# Patient Record
Sex: MALE | Race: WHITE | Employment: FULL TIME | URBAN - METROPOLITAN AREA
[De-identification: names, ages, dates, MRNs, and addresses within clinical notes are randomized per-mention and may not be internally consistent; named-entity substitution may affect disease eponyms.]

---

## 2020-11-18 ENCOUNTER — OFFICE VISIT (OUTPATIENT)
Dept: FAMILY MEDICINE | Facility: CLINIC | Age: 40
End: 2020-11-18
Payer: COMMERCIAL

## 2020-11-18 ENCOUNTER — ANCILLARY PROCEDURE (OUTPATIENT)
Dept: GENERAL RADIOLOGY | Facility: CLINIC | Age: 40
End: 2020-11-18
Attending: NURSE PRACTITIONER
Payer: COMMERCIAL

## 2020-11-18 VITALS
DIASTOLIC BLOOD PRESSURE: 88 MMHG | HEIGHT: 71 IN | BODY MASS INDEX: 26.07 KG/M2 | HEART RATE: 81 BPM | RESPIRATION RATE: 16 BRPM | SYSTOLIC BLOOD PRESSURE: 124 MMHG | OXYGEN SATURATION: 99 % | TEMPERATURE: 97.7 F | WEIGHT: 186.2 LBS

## 2020-11-18 DIAGNOSIS — M75.82 ROTATOR CUFF TENDINITIS, LEFT: Primary | ICD-10-CM

## 2020-11-18 DIAGNOSIS — M75.82 ROTATOR CUFF TENDINITIS, LEFT: ICD-10-CM

## 2020-11-18 DIAGNOSIS — M77.01 MEDIAL EPICONDYLITIS OF ELBOW, RIGHT: ICD-10-CM

## 2020-11-18 PROCEDURE — 99213 OFFICE O/P EST LOW 20 MIN: CPT | Performed by: NURSE PRACTITIONER

## 2020-11-18 PROCEDURE — 73030 X-RAY EXAM OF SHOULDER: CPT | Mod: LT | Performed by: RADIOLOGY

## 2020-11-18 RX ORDER — NAPROXEN 500 MG/1
500 TABLET ORAL 2 TIMES DAILY WITH MEALS
Qty: 60 TABLET | Refills: 1 | Status: SHIPPED | OUTPATIENT
Start: 2020-11-18 | End: 2022-02-15

## 2020-11-18 ASSESSMENT — MIFFLIN-ST. JEOR: SCORE: 1780.69

## 2020-11-18 NOTE — PROGRESS NOTES
"Subjective     Junior Rodriguez is a 40 year old male who presents to clinic today for the following health issues:    HPI         Musculoskeletal problem/pain  Onset/Duration: 1 week  Description  Location: shoulder - left  Joint Swelling: no  Redness: YES- maybe  Pain: YES  Warmth: YES  Intensity:  3-4/10, 10/10 with movement  Progression of Symptoms:  same  Accompanying signs and symptoms:   Fevers: no  Numbness/tingling/weakness: YES- all, decreased ROM  History  Trauma to the area: no  Recent illness:  no  Previous similar problem: YES  Previous evaluation:  no  Precipitating or alleviating factors:  Aggravating factors include: any type of movement  Therapies tried and outcome: rest/inactivity, acetaminophen and Ibuprofen      Musculoskeletal problem/pain  Onset/Duration: intermittent for 1 1/2 years  Description  Location: elbow - right, medial  Joint Swelling: no  Redness: no  Pain: YES  Warmth: no  Intensity:  0/10 currently  Progression of Symptoms:  worsening and intermittent  Accompanying signs and symptoms:   Fevers: no  Numbness/tingling/weakness: YES- all  History  Trauma to the area: no  Recent illness:  no  Previous similar problem: chronic issue  Previous evaluation:  no  Precipitating or alleviating factors:  Aggravating factors include: reaching out, any type of movement with arm stretched out  Therapies tried and outcome: acetaminophen and Ibuprofen        Review of Systems   Constitutional, HEENT, cardiovascular, pulmonary, gi and gu systems are negative, except as otherwise noted.      Objective    /88 (BP Location: Right arm, Patient Position: Chair, Cuff Size: Adult Regular)   Pulse 81   Temp 97.7  F (36.5  C) (Tympanic)   Resp 16   Ht 1.81 m (5' 11.25\")   Wt 84.5 kg (186 lb 3.2 oz)   SpO2 99%   BMI 25.79 kg/m    Body mass index is 25.79 kg/m .  Physical Exam   GENERAL: healthy, alert and no distress  MS: Right elbow: Joint appearance normal, ROM normal. Tenderness over the medial " epicondylitis.    Left shoulder:  Inspection: no swelling, bruising, discoloration, or obvious deformity or asymmetry  Tender: greater tuberosity  Range of Motion  Active:limited due to pain.  Passive: limited due to pain. Patient was very guarded  Strength: strength testing limited due to pain and guarding.  Special tests:  Positive: [place], Grant and cross arm adduction              Assessment & Plan     Rotator cuff tendinitis, left  - XR Shoulder Left 2 Views; Future  - PHYSICAL THERAPY REFERRAL; Future  - naproxen (NAPROSYN) 500 MG tablet; Take 1 tablet (500 mg) by mouth 2 times daily (with meals)    Medial epicondylitis of elbow, right        Patient Instructions   For elbow:  Wear elbow strap  Discussed strengthening, and stretching - handout given.  Ice or heat as preferred at least twice daily.   Return for Follow up if not improving in 4 weeks.   Consider Physical therapy at that time if needed.      For shoulder:  Schedule PT  For pain, naproxen 500 mg twice daily as needed.  Follow up if no improvement in 4 weeks.        Return in about 4 weeks (around 12/16/2020), or if symptoms worsen or fail to improve.    The risks, benefits and treatment options of prescribed medications or other treatments have been discussed with the patient. The patient verbalized their understanding and should call or follow up if no improvement or if they develop further problems.    POLLO Giraldo [facility]

## 2020-11-18 NOTE — PATIENT INSTRUCTIONS
For elbow:  Wear elbow strap  Discussed strengthening, and stretching - handout given.  Ice or heat as preferred at least twice daily.   Return for Follow up if not improving in 4 weeks.   Consider Physical therapy at that time if needed.      For shoulder:  Schedule PT  For pain, naproxen 500 mg twice daily as needed.  Follow up if no improvement in 4 weeks.

## 2020-11-24 ENCOUNTER — HOSPITAL ENCOUNTER (OUTPATIENT)
Dept: PHYSICAL THERAPY | Facility: CLINIC | Age: 40
Setting detail: THERAPIES SERIES
End: 2020-11-24
Attending: NURSE PRACTITIONER
Payer: COMMERCIAL

## 2020-11-24 DIAGNOSIS — M75.82 ROTATOR CUFF TENDINITIS, LEFT: ICD-10-CM

## 2020-11-24 PROCEDURE — 97161 PT EVAL LOW COMPLEX 20 MIN: CPT | Mod: GP | Performed by: PHYSICAL THERAPIST

## 2020-11-24 PROCEDURE — 97140 MANUAL THERAPY 1/> REGIONS: CPT | Mod: GP,59 | Performed by: PHYSICAL THERAPIST

## 2020-11-24 PROCEDURE — 97110 THERAPEUTIC EXERCISES: CPT | Mod: GP | Performed by: PHYSICAL THERAPIST

## 2020-11-24 NOTE — PROGRESS NOTES
11/24/20 0700   General Information   Type of Visit Initial OP Ortho PT Evaluation   Start of Care Date 11/24/20   Referring Physician Diann Carlos   Patient/Family Goals Statement pick his kid up, turn blinker on his car, move his arm without pain.    Orders Evaluate and Treat   Date of Order 11/18/20   Certification Required? Yes   Medical Diagnosis rotator cuff tendintis left   Surgical/Medical history reviewed Yes   Precautions/Limitations no known precautions/limitations   General Information Comments PMH: none   Body Part(s)   Body Part(s) Shoulder   Presentation and Etiology   Pertinent history of current problem (include personal factors and/or comorbidities that impact the POC) He has been having shoulder pain on the left side for about 1-2 weeks now. The shoulder pain has been coming on/off over the past years. He works in construction. He gets some numbness and tingling into all of his finger tips. Feels weak because of the pain. Rates pain at 4/10 at rest. HE has been taking Naproxen and this hasn't really made a difference. Sleeping is horrible keeps him from falling asleep and wakes him up at night. No neck pain. Pain is always deeper in the shoulder across the of the shoulder to the middle of the humerous.    Impairments A. Pain;B. Decreased WB tolerance;D. Decreased ROM;E. Decreased flexibility;F. Decreased strength and endurance;K. Numbness;L. Tingling   Functional Limitations perform activities of daily living;perform required work activities;perform desired leisure / sports activities   Symptom Location left shoulder   How/Where did it occur From insidious onset   Onset date of current episode/exacerbation 11/10/20   Chronicity New   Pain rating (0-10 point scale) Best (/10);Worst (/10)   Best (/10) 4   Worst (/10) 10   Pain quality A. Sharp;B. Dull;C. Aching;E. Shooting;F. Stabbing;G. Cramping   Frequency of pain/symptoms A. Constant   Pain/symptoms are: The same all the time    Pain/symptoms exacerbated by A. Sitting;C. Lifting;D. Carrying;E. Rest;F. Nothing;G. Certain positions;H. Overhead reach;J. ADL;K. Home tasks;L. Work tasks   Pain/symptoms eased by D. Nothing   Progression of symptoms since onset: Worsened   Current Level of Function   Patient role/employment history A. 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 No   Have you fallen and had an injury in the past year? No   Is patient a fall risk? No   Abuse Screen (yes response referral indicated)   Feels Unsafe at Home or Work/School no   Feels Threatened by Someone no   Does Anyone Try to Keep You From Having Contact with Others or Doing Things Outside Your Home? no   Physical Signs of Abuse Present no   Functional Scales   Functional Scales Other   Shoulder Objective Findings   Side (if bilateral, select both right and left) Left   Observation carrys left arm in sweatshirt pocket as a sling    Integumentary  no concerns   Posture forward shoulder posture, left heavily protracted forward in protective position    Cervical Screen (ROM, quadrant) WNL B no increase in pain,  Spurlings negative    Shoulder ROM Comment very difficult to relax and painful with all movements    Pec Minor (supine) Flexibility 4 fingers    Neer's Test + L    Grant-Pj Test + L    Shoulder Special Tests Comments unable to test due to significant pain getting into various test positions    Palpation non tender to palpatio over infra/suprap or biceps tendon    Accessory Motion/Joint Mobility AP and inferior glide normal with no increase in pain    Left Shoulder Flexion AROM 35*   Left Shoulder Flexion PROM left: 90   Left Shoulder Abduction AROM 30*   Left Shoulder Abduction PROM left: 40   Left Shoulder ER AROM 60 with arm at side   Left Shoulder ER PROM arm at side L 40 degrees   Left Shoulder IR AROM L5   Left  Shoulder Flexion Strength 3+/5 painful    Left Shoulder Abduction Strength 3+/5 painful    Left Shoulder ER Strength 3+/5 painful    Left Shoulder IR Strength 4/5 no pain    Left Shoulder Extension Strength 3+/5 painful   Planned Therapy Interventions   Planned Therapy Interventions manual therapy;joint mobilization;neuromuscular re-education;ROM;strengthening;stretching;transfer training   Planned Therapy Interventions Comment [id number]   Clinical Impression   Criteria for Skilled Therapeutic Interventions Met yes, treatment indicated   PT Diagnosis left shoulder RC tendinitis    Influenced by the following impairments decreased left shoulder ROM, decreased left shoulder strength, pain, poor posture   Functional limitations due to impairments sleeping, reaching, lifting, dressing, showering   Clinical Presentation Stable/Uncomplicated   Clinical Presentation Rationale clinical decision making and chart review    Clinical Decision Making (Complexity) Low complexity   Therapy Frequency 1 time/week   Predicted Duration of Therapy Intervention (days/wks) 10 weeks   Risk & Benefits of therapy have been explained Yes   Patient, Family & other staff in agreement with plan of care Yes   Clinical Impression Comments Patient is a 40 year old male who has had on/off shoulder pain on the L side for over a year. Most recently the shoulder became sore this last week or two. Signs and symptoms consistent with left RC tendintis with pain with all movements Passive and Active motions. Patient would benefit from skilled PT intervention to improve impairments listed above. PT prognosis is very good with skilled PT intervention    Education Assessment   Preferred Learning Style Listening;Demonstration;Pictures/video   Barriers to Learning No barriers   ORTHO GOALS   PT Ortho Eval Goals 1;2;3;4   Ortho Goal 1   Goal Identifier 1   Goal Description Patient will improve PROM left shoulder flexion to 140 degrees in order to show improved  pain levels with shoulder movements.    Target Date 12/22/20   Ortho Goal 2   Goal Identifier 2   Goal Description Patient will improve AAROM shoulder flexion to 100 degrees in order to show improved tolerace for moving shoulder   Target Date 12/22/20   Ortho Goal 3   Goal Identifier 3   Goal Description Patient will be able to reach overhead into cupboard without increased left shoulder pain.    Target Date 02/02/21   Ortho Goal 4   Goal Identifier 4   Goal Description Patient will be able to don/doff jacket without increased left shoulder pain.    Target Date 02/02/21   Total Evaluation Time   PT Eval, Low Complexity Minutes (14614) 22   Therapy Certification   Certification date from 11/24/20   Certification date to 02/02/21   Medical Diagnosis rotator cuff tendintis Left        Vanessa Villegas  PT, DPT       11/24/2020   [address] [address]  [email or web address]  Voicemail: [phone]

## 2020-11-24 NOTE — PROGRESS NOTES
[facility]          OUTPATIENT PHYSICAL THERAPY ORTHOPEDIC EVALUATION  PLAN OF TREATMENT FOR OUTPATIENT REHABILITATION  (COMPLETE FOR INITIAL CLAIMS ONLY)  Patient's Last Name, First Name, M.I.  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  [id number]   Start of Care Date:  11/24/20   Onset Date:  11/10/20   Type:     _X__PT   ___OT   ___SLP Medical Diagnosis:  (P) rotator cuff tendintis Left      PT Diagnosis:  (P) left shoulder RC tendinitis    Visits from SOC:  1      _________________________________________________________________________________  Plan of Treatment/Functional Goals:  (P) manual therapy, joint mobilization, neuromuscular re-education, ROM, strengthening, stretching, transfer training  (P) [id number]        Goals  Goal Identifier: (P) 1  Goal Description: (P) Patient will improve PROM left shoulder flexion to 140 degrees in order to show improved pain levels with shoulder movements.   Target Date: (P) 12/22/20    Goal Identifier: (P) 2  Goal Description: (P) Patient will improve AAROM shoulder flexion to 100 degrees in order to show improved tolerace for moving shoulder  Target Date: (P) 12/22/20    Goal Identifier: (P) 3  Goal Description: (P) Patient will be able to reach overhead into cupboard without increased left shoulder pain.   Target Date: (P) 02/02/21    Goal Identifier: (P) 4  Goal Description: (P) Patient will be able to don/doff jacket without increased left shoulder pain.   Target Date: (P) 02/02/21             Therapy Frequency:  (P) 1 time/week  Predicted Duration of Therapy Intervention:  (P) 10 weeks    Vanessa Villegas, PT                 I CERTIFY THE NEED FOR THESE SERVICES FURNISHED UNDER        THIS PLAN OF TREATMENT AND WHILE UNDER MY CARE     (Physician co-signature of this document indicates review and certification of the therapy plan).                       Certification Date  From:  (P) 11/24/20   Certification Date To:  (P) 02/02/21    Referring Provider:  Diann Carlos    Initial Assessment        See Epic Evaluation Start of Care Date: 11/24/20

## 2020-12-01 ENCOUNTER — HOSPITAL ENCOUNTER (OUTPATIENT)
Dept: PHYSICAL THERAPY | Facility: CLINIC | Age: 40
Setting detail: THERAPIES SERIES
End: 2020-12-01
Attending: NURSE PRACTITIONER
Payer: COMMERCIAL

## 2020-12-01 PROCEDURE — 97110 THERAPEUTIC EXERCISES: CPT | Mod: GP | Performed by: PHYSICAL THERAPIST

## 2021-02-10 NOTE — PROGRESS NOTES
Outpatient Physical Therapy Discharge Note     Patient: Junior Rodriguez  : 1980    Beginning/End Dates of Reporting Period:  20 to 20    Referring Provider: Diann Trent Diagnosis:    left shoulder RC tendinitis           Client Self Report: the shoulder is feeling better. Not as sore at his side, just if he goes to move it. Rates pain at 1-2/10.     Objective Measurements:  Objective Measure: Left shoulder ROM   Details: PROM flexion 130, abduction 100, ER arm at side: 30 degrees         Goals:  Goal Identifier 1   Goal Description Patient will improve PROM left shoulder flexion to 140 degrees in order to show improved pain levels with shoulder movements.    Target Date 20   Date Met      Progress:     Goal Identifier 2   Goal Description Patient will improve AAROM shoulder flexion to 100 degrees in order to show improved tolerace for moving shoulder   Target Date 20   Date Met      Progress:     Goal Identifier 3   Goal Description Patient will be able to reach overhead into cupboard without increased left shoulder pain.    Target Date 21   Date Met      Progress:     Goal Identifier 4   Goal Description Patient will be able to don/doff jacket without increased left shoulder pain.    Target Date 21   Date Met      Progress:       Progress towards Goals:   Progress this reporting period: All information listed above was at patient's last attended PT visit. At her last attended session, patient was showing good improvements in left shoulder ROM and pain levels. Needs frequent reminders to not push into painful ranges and to no overdo things at home. Despite improvements, pt has failed to schedule f/u visits within 30 days from last visit thus is being d/c from therapy at this time. Objective measures are all taken from last visit. Current status is unknown at this time.    Plan:  Discharge from therapy.    Discharge:    Reason for Discharge: Patient has failed to  schedule further appointments.    Equipment Issued: none    Discharge Plan:  Not completed since patient failed to schedule further appointments.    Vanessa Villegas  PT, DPT       2/10/2021   [address] [address]  [email or web address]  Voicemail: [phone]

## 2022-02-15 ENCOUNTER — ANCILLARY PROCEDURE (OUTPATIENT)
Dept: GENERAL RADIOLOGY | Facility: CLINIC | Age: 42
End: 2022-02-15
Attending: PHYSICIAN ASSISTANT
Payer: COMMERCIAL

## 2022-02-15 ENCOUNTER — OFFICE VISIT (OUTPATIENT)
Dept: FAMILY MEDICINE | Facility: CLINIC | Age: 42
End: 2022-02-15
Payer: COMMERCIAL

## 2022-02-15 VITALS
TEMPERATURE: 97.8 F | BODY MASS INDEX: 28.84 KG/M2 | WEIGHT: 206 LBS | OXYGEN SATURATION: 99 % | HEART RATE: 69 BPM | DIASTOLIC BLOOD PRESSURE: 86 MMHG | RESPIRATION RATE: 20 BRPM | HEIGHT: 71 IN | SYSTOLIC BLOOD PRESSURE: 116 MMHG

## 2022-02-15 DIAGNOSIS — Z00.00 ROUTINE GENERAL MEDICAL EXAMINATION AT A HEALTH CARE FACILITY: Primary | ICD-10-CM

## 2022-02-15 DIAGNOSIS — M25.50 MULTIPLE JOINT PAIN: ICD-10-CM

## 2022-02-15 DIAGNOSIS — Z13.1 SCREENING FOR DIABETES MELLITUS: ICD-10-CM

## 2022-02-15 DIAGNOSIS — R33.9 URINARY RETENTION: ICD-10-CM

## 2022-02-15 DIAGNOSIS — R76.8 POSITIVE ANA (ANTINUCLEAR ANTIBODY): ICD-10-CM

## 2022-02-15 DIAGNOSIS — Z11.59 NEED FOR HEPATITIS C SCREENING TEST: ICD-10-CM

## 2022-02-15 DIAGNOSIS — Z13.220 SCREENING FOR HYPERLIPIDEMIA: ICD-10-CM

## 2022-02-15 LAB
CHOLEST SERPL-MCNC: 213 MG/DL
CRP SERPL-MCNC: <2.9 MG/L (ref 0–8)
ERYTHROCYTE [SEDIMENTATION RATE] IN BLOOD BY WESTERGREN METHOD: 8 MM/HR (ref 0–15)
FASTING STATUS PATIENT QL REPORTED: ABNORMAL
FASTING STATUS PATIENT QL REPORTED: NORMAL
GLUCOSE BLD-MCNC: 99 MG/DL (ref 70–99)
HDLC SERPL-MCNC: 42 MG/DL
LDLC SERPL CALC-MCNC: 138 MG/DL
NONHDLC SERPL-MCNC: 171 MG/DL
PSA SERPL-MCNC: 0.78 UG/L (ref 0–4)
TRIGL SERPL-MCNC: 163 MG/DL

## 2022-02-15 PROCEDURE — 84153 ASSAY OF PSA TOTAL: CPT | Performed by: PHYSICIAN ASSISTANT

## 2022-02-15 PROCEDURE — 36415 COLL VENOUS BLD VENIPUNCTURE: CPT | Performed by: PHYSICIAN ASSISTANT

## 2022-02-15 PROCEDURE — 73560 X-RAY EXAM OF KNEE 1 OR 2: CPT | Mod: RT | Performed by: RADIOLOGY

## 2022-02-15 PROCEDURE — 86140 C-REACTIVE PROTEIN: CPT | Performed by: PHYSICIAN ASSISTANT

## 2022-02-15 PROCEDURE — 86803 HEPATITIS C AB TEST: CPT | Performed by: PHYSICIAN ASSISTANT

## 2022-02-15 PROCEDURE — 86039 ANTINUCLEAR ANTIBODIES (ANA): CPT | Performed by: PHYSICIAN ASSISTANT

## 2022-02-15 PROCEDURE — 86038 ANTINUCLEAR ANTIBODIES: CPT | Performed by: PHYSICIAN ASSISTANT

## 2022-02-15 PROCEDURE — 73560 X-RAY EXAM OF KNEE 1 OR 2: CPT | Mod: LT | Performed by: RADIOLOGY

## 2022-02-15 PROCEDURE — 85652 RBC SED RATE AUTOMATED: CPT | Performed by: PHYSICIAN ASSISTANT

## 2022-02-15 PROCEDURE — 99214 OFFICE O/P EST MOD 30 MIN: CPT | Mod: 25 | Performed by: PHYSICIAN ASSISTANT

## 2022-02-15 PROCEDURE — 86200 CCP ANTIBODY: CPT | Performed by: PHYSICIAN ASSISTANT

## 2022-02-15 PROCEDURE — 80061 LIPID PANEL: CPT | Performed by: PHYSICIAN ASSISTANT

## 2022-02-15 PROCEDURE — 99396 PREV VISIT EST AGE 40-64: CPT | Performed by: PHYSICIAN ASSISTANT

## 2022-02-15 PROCEDURE — 86431 RHEUMATOID FACTOR QUANT: CPT | Performed by: PHYSICIAN ASSISTANT

## 2022-02-15 PROCEDURE — 73565 X-RAY EXAM OF KNEES: CPT | Performed by: RADIOLOGY

## 2022-02-15 PROCEDURE — 82947 ASSAY GLUCOSE BLOOD QUANT: CPT | Performed by: PHYSICIAN ASSISTANT

## 2022-02-15 RX ORDER — IBUPROFEN 800 MG/1
800 TABLET, FILM COATED ORAL EVERY 8 HOURS PRN
Qty: 180 TABLET | Refills: 3 | Status: CANCELLED | OUTPATIENT
Start: 2022-02-15

## 2022-02-15 RX ORDER — IBUPROFEN 800 MG/1
800 TABLET, FILM COATED ORAL EVERY 8 HOURS PRN
Qty: 180 TABLET | Refills: 3 | Status: SHIPPED | OUTPATIENT
Start: 2022-02-15 | End: 2022-04-29

## 2022-02-15 RX ORDER — NAPROXEN 500 MG/1
500 TABLET ORAL 2 TIMES DAILY WITH MEALS
Qty: 60 TABLET | Refills: 1 | Status: CANCELLED | OUTPATIENT
Start: 2022-02-15

## 2022-02-15 RX ORDER — OMEGA-3 FATTY ACIDS/FISH OIL 300-1000MG
800 CAPSULE ORAL EVERY 4 HOURS PRN
Status: CANCELLED | OUTPATIENT
Start: 2022-02-15

## 2022-02-15 ASSESSMENT — ENCOUNTER SYMPTOMS
COUGH: 0
NERVOUS/ANXIOUS: 0
PARESTHESIAS: 0
FREQUENCY: 1
SHORTNESS OF BREATH: 0
HEARTBURN: 0
DIZZINESS: 0
PALPITATIONS: 0
HEMATURIA: 0
EYE PAIN: 0
CHILLS: 0
MYALGIAS: 1
WEAKNESS: 0
DIARRHEA: 0
CONSTIPATION: 1
FEVER: 0
HEADACHES: 0
SORE THROAT: 0
ARTHRALGIAS: 1
NAUSEA: 0
ABDOMINAL PAIN: 0
HEMATOCHEZIA: 0
JOINT SWELLING: 1
DYSURIA: 0

## 2022-02-15 ASSESSMENT — PAIN SCALES - GENERAL: PAINLEVEL: MODERATE PAIN (4)

## 2022-02-15 ASSESSMENT — MIFFLIN-ST. JEOR: SCORE: 1865.5

## 2022-02-15 NOTE — LETTER
February 17, 2022      Junior Rodriguez  [address] [address]  [place] [facility] [place] [address]        Dear ,    We are writing to inform you of your test results.    lab work shows a positive VALERIE test.  This is a nonspecific autoimmune antibody test that is only moderately positive.  The rest of his work-up for rheumatoid arthritis is negative.  I do think that given his symmetrical joint pain in this positive test that there is some form of autoimmune arthritis.  However I would like to just recheck an VALERIE test in about 2 to 3 months to make sure it stays positive.  If this is the case we would send him to the rheumatologist for their evaluation.     Remainder of labs for diabetes and cholesterol are largely normal.  Recommend more of a plant-based diet and eating less processed/fatty/sugary foods.       Resulted Orders   PSA, tumor marker   Result Value Ref Range    PSA Tumor Marker 0.78 0.00 - 4.00 ug/L    Narrative    Assay Method:  Chemiluminescence using Siemens   Vista analyzer.   ESR: Erythrocyte sedimentation rate   Result Value Ref Range    Erythrocyte Sedimentation Rate 8 0 - 15 mm/hr   CRP, inflammation   Result Value Ref Range    CRP Inflammation <2.9 0.0 - 8.0 mg/L   Rheumatoid factor   Result Value Ref Range    Rheumatoid Factor 9 <12 IU/mL   Cyclic Citrullinated Peptide Antibody IgG   Result Value Ref Range    Cyclic Citrullinated Peptide Antibody IgG 2.0 <7.0 U/mL      Comment:      Negative   Anti Nuclear Gabriela IgG by IFA with Reflex   Result Value Ref Range    VALERIE interpretation Positive (A) Negative      Comment:        Negative:              <1:40  Borderline Positive:   1:40 - 1:80  Positive:              >1:80    VALERIE pattern 1 Speckled     VALERIE titer 1 1:160    Lipid panel reflex to direct LDL Fasting   Result Value Ref Range    Cholesterol 213 (H) <200 mg/dL    Triglycerides 163 (H) <150 mg/dL    Direct Measure HDL 42 >=40 mg/dL    LDL Cholesterol Calculated 138 (H) <=100 mg/dL    Non HDL  Cholesterol 171 (H) <130 mg/dL    Patient Fasting > 8hrs? Unknown     Narrative    Cholesterol  Desirable:  <200 mg/dL    Triglycerides  Normal:  Less than 150 mg/dL  Borderline High:  150-199 mg/dL  High:  200-499 mg/dL  Very High:  Greater than or equal to 500 mg/dL    Direct Measure HDL  Female:  Greater than or equal to 50 mg/dL   Male:  Greater than or equal to 40 mg/dL    LDL Cholesterol  Desirable:  <100mg/dL  Above Desirable:  100-129 mg/dL   Borderline High:  130-159 mg/dL   High:  160-189 mg/dL   Very High:  >= 190 mg/dL    Non HDL Cholesterol  Desirable:  130 mg/dL  Above Desirable:  130-159 mg/dL  Borderline High:  160-189 mg/dL  High:  190-219 mg/dL  Very High:  Greater than or equal to 220 mg/dL   Hepatitis C Screen Reflex to HCV RNA Quant and Genotype   Result Value Ref Range    Hepatitis C Antibody Nonreactive Nonreactive    Narrative    Assay performance characteristics have not been established for newborns, infants, and children.   GLUCOSE   Result Value Ref Range    Glucose 99 70 - 99 mg/dL    Patient Fasting > 8hrs? Unknown        If you have any questions or concerns, please call the clinic at the number listed above.       Sincerely,      Johnson Chowdhury PA-C/[place]

## 2022-02-15 NOTE — PATIENT INSTRUCTIONS
Preventive Health Recommendations  Male Ages 40 to 49  Yearly exam:             See your health care provider every year in order to  o   Review health changes.   o   Discuss preventive care.    o   Review your medicines if your doctor has prescribed any.    You should be tested each year for STDs (sexually transmitted diseases) if you re at risk.     Have a cholesterol test every 5 years.     Have a colonoscopy (test for colon cancer) if someone in your family has had colon cancer or polyps before age 50.     After age 45, have a diabetes test (fasting glucose). If you are at risk for diabetes, you should have this test every 3 years.      Talk with your health care provider about whether or not a prostate cancer screening test (PSA) is right for you.    Shots: Get a flu shot each year. Get a tetanus shot every 10 years.     Nutrition:    Eat at least 5 servings of fruits and vegetables daily.     Eat whole-grain bread, whole-wheat pasta and brown rice instead of white grains and rice.     Get adequate Calcium and Vitamin D.     Lifestyle    Exercise for at least 150 minutes a week (30 minutes a day, 5 days a week). This will help you control your weight and prevent disease.     Limit alcohol to one drink per day.     No smoking.     Wear sunscreen to prevent skin cancer.     See your dentist every six months for an exam and cleaning.

## 2022-02-15 NOTE — PROGRESS NOTES
SUBJECTIVE:   CC: Junior Rodriguez is an 41 year old male who presents for preventative health visit.     Patient has been advised of split billing requirements and indicates understanding: Yes     Healthy Habits:     Getting at least 3 servings of Calcium per day:  NO    Bi-annual eye exam:  Yes    Dental care twice a year:  NO    Sleep apnea or symptoms of sleep apnea:  None    Diet:  Regular (no restrictions)    Frequency of exercise:  2-3 days/week    Duration of exercise:  45-60 minutes    Taking medications regularly:  Yes    Medication side effects:  None    PHQ-2 Total Score: 0    Additional concerns today:  Yes    Musculoskeletal problem/pain    Duration: Years     Description  Location: Shoulder, elbows, knee     Intensity:  Moderate to  severe    Accompanying signs and symptoms: numbness, tingling and weakness    History  Previous similar problem: YES  Previous evaluation:  x-ray, MRI and CT    Precipitating or alleviating factors:  Trauma or overuse: YES  Aggravating factors include: overuse    Therapies tried and outcome: Ibuprofen, naproxen     Lateral elbow pain bilaterally - pain with lifting, shaking hands  Bilateral shoulder pain - hx of dislocation, pain at night sleeping, no MRIs  Bilateral knee pain - anterior patella, paige-lateral.  Low back pain  -Interested in cortisone injection if needed.     Today's PHQ-2 Score:   PHQ-2 ( 1999 [employer]) 2/15/2022   Q1: Little interest or pleasure in doing things 0   Q2: Feeling down, depressed or hopeless 0   PHQ-2 Score 0   PHQ-2 Total Score (12-17 Years)- Positive if 3 or more points; Administer PHQ-A if positive -   Q1: Little interest or pleasure in doing things Not at all   Q2: Feeling down, depressed or hopeless Not at all   PHQ-2 Score 0     Abuse: Current or Past(Physical, Sexual or Emotional)- No  Do you feel safe in your environment? Yes    Have you ever done Advance Care Planning? (For example, a Health Directive, POLST, or a discussion with a  medical provider or your loved ones about your wishes): No, advance care planning information given to patient to review.  Patient plans to discuss their wishes with loved ones or provider.      Social History     Tobacco Use     Smoking status: Current Every Day Smoker     Packs/day: 1.00     Years: 20.00     Pack years: 20.00     Smokeless tobacco: Never Used   Substance Use Topics     Alcohol use: No     If you drink alcohol do you typically have >3 drinks per day or >7 drinks per week? No    Alcohol Use 2/15/2022   Prescreen: >3 drinks/day or >7 drinks/week? Not Applicable   Prescreen: >3 drinks/day or >7 drinks/week? -   No flowsheet data found.    Last PSA: No results found for: PSA    Reviewed orders with patient. Reviewed health maintenance and updated orders accordingly - Yes  Lab work is in process    Reviewed and updated as needed this visit by clinical staff  Tobacco  Allergies  Meds  Problems  Med Hx  Surg Hx  Fam Hx  Soc Hx         Reviewed and updated as needed this visit by Provider  Tobacco  Allergies  Meds  Problems  Med Hx  Surg Hx  Fam Hx        Review of Systems   Constitutional: Negative for chills and fever.   HENT: Negative for congestion, ear pain, hearing loss and sore throat.    Eyes: Negative for pain and visual disturbance.   Respiratory: Negative for cough and shortness of breath.    Cardiovascular: Negative for chest pain, palpitations and peripheral edema.   Gastrointestinal: Positive for constipation. Negative for abdominal pain, diarrhea, heartburn, hematochezia and nausea.   Genitourinary: Positive for frequency and urgency. Negative for dysuria, genital sores, hematuria, impotence and penile discharge.   Musculoskeletal: Positive for arthralgias, joint swelling and myalgias.   Skin: Negative for rash.   Neurological: Negative for dizziness, weakness, headaches and paresthesias.   Psychiatric/Behavioral: Negative for mood changes. The patient is not nervous/anxious.   "    OBJECTIVE:   /86 (BP Location: Right arm, Patient Position: Sitting, Cuff Size: Adult Large)   Pulse 69   Temp 97.8  F (36.6  C) (Tympanic)   Resp 20   Ht 1.81 m (5' 11.25\")   Wt 93.4 kg (206 lb)   SpO2 99%   BMI 28.53 kg/m      Physical Exam  GENERAL APPEARANCE: healthy, alert and no distress  NECK: no adenopathy, no asymmetry, masses, or scars and thyroid normal to palpation  RESP: lungs clear to auscultation - no rales, rhonchi or wheezes  CV: regular rates and rhythm, normal S1 S2, no S3 or S4 and no murmur, click or rub  ABDOMEN: bowel sounds normal. Soft mild diffuse tenderness throughout. no hepatosplenomegaly or masses, no scars, striae, dilated veins, rashes, or lesions.  ORTHO:   Shoulder: inspection: no swelling, deformity or atrophy.  Tender: non tender over AC joint, supraspinatus insertion, biceps groove or infraspinatus insertion.   ROM: Full ROM bilaterally. 0 to 180 extension. 0 to 100 external rotation. T7 internal rotation.   Strength: 5/5 strength throughout with pain.   Special: painful arc. Negative speeds. Positive neer's bilaterally.     Elbow Exam: Inspection: no swelling, no olecranon bursa swelling, no distal bicep tendon defect  Non-tender: lateral epicondyle, medial epicondyle and radial head/neck  Range of Motion: all normal  Strength: elbow strength full  Special tests: no pain with resisted wrist extension, no pain with resisted wrist flexion:   Knee Exam: Inspection: AP/lateral alignment normal, No effusion  Tender: distal IT band  Non-tender: lateral patellar facet, medial patellar facet, inferior pole patella, lateral joint line, medial joint line, popliteal region  Active Range of Motion: all normal bilaterally.   Strength:5/5 strength throughout bilaterally.   Special tests: normal Valgus stress test, normal Varus, no apprehension with lateral stress of the patella  SKIN: no suspicious lesions or rashes  PSYCH: mentation appears normal and affect " normal/bright    Diagnostic Test Results:  Labs reviewed in Epic    ASSESSMENT/PLAN:   Routine general medical examination at a health care facility  41 yr old here for physical exam. Last physical exam done several years ago. Discussed preventative screenings which are updated below. Counseled on immunizations and healthy lifestyle. Declined PPSV23 and Covid vaccines today. Follow-up in 1 year for repeat physical exam.  - REVIEW OF HEALTH MAINTENANCE PROTOCOL ORDERS    Need for hepatitis C screening test  Due for screening. Ordered today.   - Hepatitis C Screen Reflex to HCV RNA Quant and Genotype; Future    Screening for hyperlipidemia  Due for screening. Ordered today.   - Lipid panel reflex to direct LDL Fasting; Future    Screening for diabetes mellitus  Due for screening. Ordered today.   - GLUCOSE; Future    Multiple joint pain  Chronic symmetric joint pain worse in the elbows, shoulders and knees.  While I do think that some of his pain is related to his work as a  and not adequate strengthening or stretching he has not been worked up for more systemic causes of his pain.  We will work-up for RA, x-rays of his knees today.  Did consider a connective tissue disease like EDS given his age and chronic joint pain.  He can continue ibuprofen as needed for pain.  - Anti Nuclear Gabriela IgG by IFA with Reflex; Future  - Cyclic Citrullinated Peptide Antibody IgG; Future  - Rheumatoid factor; Future  - CRP, inflammation; Future  - ESR: Erythrocyte sedimentation rate; Future  - XR Knee AP Standing Bilateral; Future  - XR Knee Right 3 Views; Future  - XR Knee Left 3 Views; Future    Urinary retention  Worsening over the years.  Check a PSA today.  - PSA, tumor marker; Future    Patient has been advised of split billing requirements and indicates understanding: Yes    COUNSELING:   Reviewed preventive health counseling, as reflected in patient instructions       Regular exercise       Healthy  "diet/nutrition       Vision screening       Immunizations    Declined: Pneumococcal, Covid due to Concerns about side effects/safety           Consider Hep C screening for all patients one time for ages 18-79 years    Estimated body mass index is 28.53 kg/m  as calculated from the following:    Height as of this encounter: 1.81 m (5' 11.25\").    Weight as of this encounter: 93.4 kg (206 lb).     Weight management plan: Discussed healthy diet and exercise guidelines    He reports that he has been smoking. He has a 20.00 pack-year smoking history. He has never used smokeless tobacco.  Tobacco Cessation Action Plan:   Information offered: Patient not interested at this time    Physician Attestation   I, Johnson Chowdhury, was present with the medical/SUSAN student who participated in the service and in the documentation of the note.  I have verified the history and personally performed the physical exam and medical decision making.  I agree with the assessment and plan of care as documented in the note.      JEANETH Amato PA-C  [facility]  "

## 2022-02-16 LAB
ANA PAT SER IF-IMP: ABNORMAL
ANA SER QL IF: POSITIVE
ANA TITR SER IF: ABNORMAL {TITER}
CCP AB SER IA-ACNC: 2 U/ML
HCV AB SERPL QL IA: NONREACTIVE
RHEUMATOID FACT SER NEPH-ACNC: 9 IU/ML

## 2022-03-10 ENCOUNTER — OFFICE VISIT (OUTPATIENT)
Dept: URGENT CARE | Facility: URGENT CARE | Age: 42
End: 2022-03-10
Payer: COMMERCIAL

## 2022-03-10 VITALS
HEART RATE: 80 BPM | TEMPERATURE: 98.2 F | DIASTOLIC BLOOD PRESSURE: 80 MMHG | SYSTOLIC BLOOD PRESSURE: 112 MMHG | RESPIRATION RATE: 18 BRPM | WEIGHT: 206 LBS | OXYGEN SATURATION: 98 % | BODY MASS INDEX: 28.53 KG/M2

## 2022-03-10 DIAGNOSIS — M25.50 MULTIPLE JOINT PAIN: ICD-10-CM

## 2022-03-10 DIAGNOSIS — R10.31 RLQ ABDOMINAL PAIN: Primary | ICD-10-CM

## 2022-03-10 LAB
ALBUMIN UR-MCNC: NEGATIVE MG/DL
AMORPH CRY #/AREA URNS HPF: ABNORMAL /HPF
APPEARANCE UR: ABNORMAL
BACTERIA #/AREA URNS HPF: ABNORMAL /HPF
BILIRUB UR QL STRIP: NEGATIVE
COLOR UR AUTO: YELLOW
ERYTHROCYTE [DISTWIDTH] IN BLOOD BY AUTOMATED COUNT: 12.6 % (ref 10–15)
GLUCOSE UR STRIP-MCNC: NEGATIVE MG/DL
HCT VFR BLD AUTO: 38.8 % (ref 40–53)
HGB BLD-MCNC: 13.3 G/DL (ref 13.3–17.7)
HGB UR QL STRIP: NEGATIVE
KETONES UR STRIP-MCNC: NEGATIVE MG/DL
LEUKOCYTE ESTERASE UR QL STRIP: NEGATIVE
MCH RBC QN AUTO: 31.5 PG (ref 26.5–33)
MCHC RBC AUTO-ENTMCNC: 34.3 G/DL (ref 31.5–36.5)
MCV RBC AUTO: 92 FL (ref 78–100)
NITRATE UR QL: NEGATIVE
PH UR STRIP: 7.5 [PH] (ref 5–7)
PLATELET # BLD AUTO: 288 10E3/UL (ref 150–450)
RBC # BLD AUTO: 4.22 10E6/UL (ref 4.4–5.9)
RBC #/AREA URNS AUTO: ABNORMAL /HPF
SP GR UR STRIP: 1.02 (ref 1–1.03)
UROBILINOGEN UR STRIP-ACNC: 0.2 E.U./DL
WBC # BLD AUTO: 9.8 10E3/UL (ref 4–11)
WBC #/AREA URNS AUTO: ABNORMAL /HPF

## 2022-03-10 PROCEDURE — 81001 URINALYSIS AUTO W/SCOPE: CPT | Performed by: STUDENT IN AN ORGANIZED HEALTH CARE EDUCATION/TRAINING PROGRAM

## 2022-03-10 PROCEDURE — 99214 OFFICE O/P EST MOD 30 MIN: CPT | Performed by: STUDENT IN AN ORGANIZED HEALTH CARE EDUCATION/TRAINING PROGRAM

## 2022-03-10 PROCEDURE — 86038 ANTINUCLEAR ANTIBODIES: CPT | Performed by: STUDENT IN AN ORGANIZED HEALTH CARE EDUCATION/TRAINING PROGRAM

## 2022-03-10 PROCEDURE — 86039 ANTINUCLEAR ANTIBODIES (ANA): CPT | Performed by: STUDENT IN AN ORGANIZED HEALTH CARE EDUCATION/TRAINING PROGRAM

## 2022-03-10 PROCEDURE — 36415 COLL VENOUS BLD VENIPUNCTURE: CPT | Performed by: STUDENT IN AN ORGANIZED HEALTH CARE EDUCATION/TRAINING PROGRAM

## 2022-03-10 PROCEDURE — 85027 COMPLETE CBC AUTOMATED: CPT | Performed by: STUDENT IN AN ORGANIZED HEALTH CARE EDUCATION/TRAINING PROGRAM

## 2022-03-10 ASSESSMENT — PAIN SCALES - GENERAL: PAINLEVEL: MODERATE PAIN (5)

## 2022-03-10 NOTE — PROGRESS NOTES
Assessment & Plan     RLQ abdominal pain  Patient states RLQ/groin pain x1 month on assessment when palpated pain rates pain 8/10. There is a possible weakening in the right lower abdominal musculature but no clear hernia felt on exam. WBC normal. UA unremarkable. Patient denies urinary symptoms and states he has had no nausea, vomiting or diarrhea. Further workup is needed to evaluate the cause of the pain related to possible hernia or kidney stone. Unlikely appendicitis due to length of symptoms however given tenderness in the RLQ and no other identifiable cause, will get CT to rule this out. . Patient has a scheduled CT for tomorrow.   - CBC with platelets  - UA Macro with Reflex to Micro and Culture - lab collect  - CBC with platelets  - UA Macro with Reflex to Micro and Culture - lab collect  - Urine Microscopic  - CT Abdomen Pelvis w Contrast    Multiple joint pain  - Anti Nuclear Gabriela IgG by IFA with Reflex     Physician Attestation   I, POLLO Bush CNP, saw this patient and agree with the findings and plan of care as documented in the note by Naheed Orellana, ANJELICA student.    POLLO Bush CNP    35 minutes spent on the date of the encounter doing chart review, review of test results, interpretation of tests, patient visit and documentation      No follow-ups on file.    POLLO Bush CNP  [facility]    Karl Pacheco is a 41 year old male who presents to clinic today for the following health issues: Patient states about a month ago he started having RLQ/groing pain that he describes as burning. 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 He denies fever, chills, headache, vomiting or diarrhea. He states today when he was at the store that the pain got so bad he had to stop and rest and it feels better when he pushes in  on the spot with pain.  Chief Complaint   Patient presents with     Groin Pain     2 months having groin pain getting worse for 1 week, burning pain now on the right side, not sleeping. Taking ibuprofen.     HPI  Dryer broke and was cleaning around it.       Abdominal Pain    Location: LUQ and groin   Radiation: None.    Pain character: aching and burning,   Severity: 8 on a scale of 1-10.    Duration: 1 month(s)   Course of Illness: slowly progressive.  Exacerbated by: movement/walking  Relieved by: remaining still.  Associated Symptoms: none.  Female : Not applicable  Surgical History: none        Review of Systems  Constitutional, HEENT, cardiovascular, pulmonary, gi and gu systems are negative, except as otherwise noted.      Objective    /80 (BP Location: Right arm, Patient Position: Sitting, Cuff Size: Adult Large)   Pulse 80   Temp 98.2  F (36.8  C) (Tympanic)   Resp 18   Wt 93.4 kg (206 lb)   SpO2 98%   BMI 28.53 kg/m    Physical Exam   GENERAL: healthy, alert and no distress  NECK: no adenopathy, no asymmetry, masses, or scars and thyroid normal to palpation  RESP: lungs clear to auscultation - no rales, rhonchi or wheezes  CV: regular rate and rhythm, normal S1 S2, no S3 or S4, no murmur, click or rub, no peripheral edema and peripheral pulses strong  ABDOMEN: soft, nontender, without hepatosplenomegaly or masses, tenderness LUQ, bowel sounds normal and no palpable or pulsatile masses  MS: no gross musculoskeletal defects noted, no edema

## 2022-03-11 ENCOUNTER — HOSPITAL ENCOUNTER (OUTPATIENT)
Dept: CT IMAGING | Facility: CLINIC | Age: 42
Discharge: HOME | End: 2022-03-11
Attending: STUDENT IN AN ORGANIZED HEALTH CARE EDUCATION/TRAINING PROGRAM | Admitting: STUDENT IN AN ORGANIZED HEALTH CARE EDUCATION/TRAINING PROGRAM
Payer: COMMERCIAL

## 2022-03-11 DIAGNOSIS — R10.31 RLQ ABDOMINAL PAIN: ICD-10-CM

## 2022-03-11 LAB
ANA PAT SER IF-IMP: ABNORMAL
ANA SER QL IF: ABNORMAL
ANA TITR SER IF: ABNORMAL {TITER}

## 2022-03-11 PROCEDURE — 74177 CT ABD & PELVIS W/CONTRAST: CPT

## 2022-03-11 PROCEDURE — 250N000011 HC RX IP 250 OP 636: Performed by: STUDENT IN AN ORGANIZED HEALTH CARE EDUCATION/TRAINING PROGRAM

## 2022-03-11 PROCEDURE — 250N000009 HC RX 250: Performed by: STUDENT IN AN ORGANIZED HEALTH CARE EDUCATION/TRAINING PROGRAM

## 2022-03-11 RX ORDER — IOPAMIDOL 755 MG/ML
100 INJECTION, SOLUTION INTRAVASCULAR ONCE
Status: COMPLETED | OUTPATIENT
Start: 2022-03-11 | End: 2022-03-11

## 2022-03-11 RX ADMIN — IOPAMIDOL 100 ML: 755 INJECTION, SOLUTION INTRAVENOUS at 16:19

## 2022-03-11 RX ADMIN — SODIUM CHLORIDE 65 ML: 9 INJECTION, SOLUTION INTRAVENOUS at 16:19

## 2022-03-14 ENCOUNTER — TELEPHONE (OUTPATIENT)
Dept: FAMILY MEDICINE | Facility: CLINIC | Age: 42
End: 2022-03-14
Payer: COMMERCIAL

## 2022-03-14 NOTE — TELEPHONE ENCOUNTER
Reason for Call:  CT Results     Detailed comments: Pt is asking CT Results. Pt said he is sore not as bad as when he came in.    Phone Number Patient can be reached at: Home number on file [phone]    Best Time: Any Time      Can we leave a detailed message on this number? YES    Call taken on 3/14/2022 at 10:21 AM by Ramona Alexander

## 2022-03-14 NOTE — TELEPHONE ENCOUNTER
Called and notified patient that the ordering provider has not yet reviewed his results.    Pamela Luque MA

## 2022-03-17 ENCOUNTER — VIRTUAL VISIT (OUTPATIENT)
Dept: FAMILY MEDICINE | Facility: CLINIC | Age: 42
End: 2022-03-17
Payer: COMMERCIAL

## 2022-03-17 DIAGNOSIS — M25.551 HIP PAIN, RIGHT: Primary | ICD-10-CM

## 2022-03-17 DIAGNOSIS — K86.2 PANCREATIC CYST: ICD-10-CM

## 2022-03-17 DIAGNOSIS — K76.0 FATTY LIVER DISEASE, NONALCOHOLIC: ICD-10-CM

## 2022-03-17 PROCEDURE — 99214 OFFICE O/P EST MOD 30 MIN: CPT | Mod: GT | Performed by: PHYSICIAN ASSISTANT

## 2022-03-17 RX ORDER — CYCLOBENZAPRINE HCL 5 MG
5 TABLET ORAL 3 TIMES DAILY PRN
Qty: 30 TABLET | Refills: 0 | Status: SHIPPED | OUTPATIENT
Start: 2022-03-17 | End: 2022-11-28

## 2022-03-17 NOTE — PROGRESS NOTES
Junior is a 41 year old who is being evaluated via a billable video visit.      How would you like to obtain your AVS? Mail a copy  If the video visit is dropped, the invitation should be resent by: Text to cell phone: [phone]  Will anyone else be joining your video visit? No    Video Start Time: 3:26 PM    Assessment & Plan   Hip pain, right  Unclear etiology. Work up in  for RLQ abdominal pain was unremarkable. Pt is worse with movement of the hip joint. Recommend return clinic appt as his symptoms need an exam. Rx for Flexeril in the meantime. Recommend R hip XR.   - XR Hip Right 2-3 Views; Future  - cyclobenzaprine (FLEXERIL) 5 MG tablet; Take 1 tablet (5 mg) by mouth 3 times daily as needed for muscle spasms    Pancreatic cyst  As seen on CT Abd/Pelv. 2.5 cm cystic lesion in the uncinate process of the pancreas. Check lipase. Will discuss E-consult with patient at next appt.   - Lipase; Future    Fatty liver disease, nonalcoholic  Fatty liver seen on CT. Unclear cause. Will evaluate with LFTs.   - Hepatic panel (Albumin, ALT, AST, Bili, Alk Phos, TP); Future    Return in about 1 week (around 3/24/2022) for In-Clinic Visit.    JEANETH Amato [facility]    Subjective   Junior is a 41 year old who presents for the following health issues     HPI   Abdominal Pain    Duration: off and on for the last few months     Description (location/character/radiation): Patient states that this pain has been off and on. Says that he is still having this pain off and on since being seen in the urgent care. Would like to discuss the results of his CT scan that was on done on 03/11/2022       Associated flank pain: None    Intensity:  Mild to moderate     Accompanying signs and symptoms:        Fever/Chills: no        Gas/Bloating: no        Nausea/vomitting: no        Diarrhea: no        Dysuria or Hematuria: no     History (previous similar pain/trauma/previous testing): none      Precipitating or alleviating factors:       Pain worse with eating/BM/urination: movement.        Pain relieved by BM: no     Therapies tried and outcome: Ibuprofen     LMP:  not applicable    Review of Systems   See HPI       Objective       Vitals:  No vitals were obtained today due to virtual visit.    Physical Exam   GENERAL: Healthy, alert and no distress  EYES: Eyes grossly normal to inspection.  No discharge or erythema, or obvious scleral/conjunctival abnormalities.  RESP: No audible wheeze, cough, or visible cyanosis.  No visible retractions or increased work of breathing.    SKIN: Visible skin clear. No significant rash, abnormal pigmentation or lesions.  NEURO: Cranial nerves grossly intact.  Mentation and speech appropriate for age.  PSYCH: Mentation appears normal, affect normal/bright, judgement and insight intact, normal speech and appearance well-groomed.      Video-Visit Details    Type of service:  Video Visit    Video End Time:3:46 PM    Originating Location (pt. Location): Home    Distant Location (provider location):  [facility]     Platform used for Video Visit: [employer]

## 2022-03-20 NOTE — TELEPHONE ENCOUNTER
Patient had appointment with Molina Chowdhury and has in person appointment scheduled for this week to follow up on his pain and CT results.  Johanna Banegas, CNP

## 2022-03-23 NOTE — PATIENT INSTRUCTIONS
Come to clinic for a repeat exam for your hip pain?     Try Flexeril.     We will also talk aouut your CT scan results.

## 2022-03-24 ENCOUNTER — OFFICE VISIT (OUTPATIENT)
Dept: FAMILY MEDICINE | Facility: CLINIC | Age: 42
End: 2022-03-24
Payer: COMMERCIAL

## 2022-03-24 ENCOUNTER — E-CONSULT (OUTPATIENT)
Dept: GASTROENTEROLOGY | Facility: CLINIC | Age: 42
End: 2022-03-24
Payer: COMMERCIAL

## 2022-03-24 ENCOUNTER — ANCILLARY PROCEDURE (OUTPATIENT)
Dept: GENERAL RADIOLOGY | Facility: CLINIC | Age: 42
End: 2022-03-24
Attending: PHYSICIAN ASSISTANT
Payer: COMMERCIAL

## 2022-03-24 VITALS
BODY MASS INDEX: 29 KG/M2 | SYSTOLIC BLOOD PRESSURE: 110 MMHG | WEIGHT: 209.4 LBS | RESPIRATION RATE: 18 BRPM | DIASTOLIC BLOOD PRESSURE: 82 MMHG | HEART RATE: 70 BPM | TEMPERATURE: 98.2 F

## 2022-03-24 DIAGNOSIS — M25.551 HIP PAIN, RIGHT: ICD-10-CM

## 2022-03-24 DIAGNOSIS — R10.31 RIGHT INGUINAL PAIN: Primary | ICD-10-CM

## 2022-03-24 DIAGNOSIS — K86.2 PANCREATIC CYST: ICD-10-CM

## 2022-03-24 DIAGNOSIS — K76.0 FATTY LIVER DISEASE, NONALCOHOLIC: ICD-10-CM

## 2022-03-24 LAB
ALBUMIN SERPL-MCNC: 3.9 G/DL (ref 3.4–5)
ALP SERPL-CCNC: 59 U/L (ref 40–150)
ALT SERPL W P-5'-P-CCNC: 25 U/L (ref 0–70)
AST SERPL W P-5'-P-CCNC: 19 U/L (ref 0–45)
BILIRUB DIRECT SERPL-MCNC: 0.1 MG/DL (ref 0–0.2)
BILIRUB SERPL-MCNC: 0.5 MG/DL (ref 0.2–1.3)
LIPASE SERPL-CCNC: 424 U/L (ref 73–393)
PROT SERPL-MCNC: 7.6 G/DL (ref 6.8–8.8)

## 2022-03-24 PROCEDURE — 73502 X-RAY EXAM HIP UNI 2-3 VIEWS: CPT | Performed by: RADIOLOGY

## 2022-03-24 PROCEDURE — 99451 NTRPROF PH1/NTRNET/EHR 5/>: CPT | Performed by: INTERNAL MEDICINE

## 2022-03-24 PROCEDURE — 36415 COLL VENOUS BLD VENIPUNCTURE: CPT | Performed by: PHYSICIAN ASSISTANT

## 2022-03-24 PROCEDURE — 99207 E-CONSULT TO GASTROENTEROLOGY (ADULT OUTPT PROVIDER TO SPECIALIST WRITTEN QUESTION & RESPONSE): CPT | Performed by: PHYSICIAN ASSISTANT

## 2022-03-24 PROCEDURE — 80076 HEPATIC FUNCTION PANEL: CPT | Performed by: PHYSICIAN ASSISTANT

## 2022-03-24 PROCEDURE — 99214 OFFICE O/P EST MOD 30 MIN: CPT | Performed by: PHYSICIAN ASSISTANT

## 2022-03-24 PROCEDURE — 83690 ASSAY OF LIPASE: CPT | Performed by: PHYSICIAN ASSISTANT

## 2022-03-24 ASSESSMENT — PAIN SCALES - GENERAL: PAINLEVEL: MILD PAIN (2)

## 2022-03-24 NOTE — PROGRESS NOTES
Assessment & Plan   Right inguinal pain  Work-up thus far is included a CT scan and routine blood work that has come back unremarkable.  Pain is located in the right inguinal canal and there is a slight bulge especially compared to the left side on exam today.  Hip x-ray was largely unremarkable per my read.  Suspect that the patient has a right inguinal hernia without evidence of incarceration today.  Ultrasound done for further evaluation and will follow up with general surgery if indicated based on ultrasound.  - US Hernia Evaluation; Future    Pancreatic cyst  Found incidentally on CT scan during evaluation for right lower quadrant pain.  He is currently asymptomatic otherwise except for his right inguinal pain above.  Patient agreeable to E consult which was placed today  - Lipase    Fatty liver disease, nonalcoholic  Incidentally found on CT as well.  Patient has lost a significant amount of weight recently and this could just be related to obesity.  Denies any significant alcohol use.  LFTs are pending for further evaluation but discussed pathogenesis and usual monitoring of fatty liver disease.  - Hepatic panel (Albumin, ALT, AST, Bili, Alk Phos, TP)      Return in about 4 weeks (around 4/21/2022), or if symptoms worsen or fail to improve, for In-Clinic Visit.    Johnson Chowdhury PA-C  [facility]    Karl Pacheco is a 41 year old who presents for the following health issues     HPI   Follow up   Hip pain   -Patient states that he is not having much pain yet today, but that he also hasn't been walking or doing much yet today   -Pain is usually worse with activity especially walking.  Some motions including hip abduction are also tender.   -Very little to no pain if he is not doing much.    Discussed CT findings  - Discuss further work up for fatty liver disease.  LFTs are pending  -Discussed pancreas cyst.  Patient agreed with E consult..    Review of Systems   See HPI       Objective    /82 (BP Location: Right arm, Patient Position: Sitting, Cuff Size: Adult Large)   Pulse 70   Temp 98.2  F (36.8  C) (Tympanic)   Resp 18   Wt 95 kg (209 lb 6.4 oz)   BMI 29.00 kg/m    Body mass index is 29 kg/m .  Physical Exam   GENERAL: healthy, alert and no distress  NECK: no adenopathy, no asymmetry, masses, or scars and thyroid normal to palpation  ABDOMEN: soft, nontender, no hepatosplenomegaly, no masses and bowel sounds normal   (male): Tenderness over the right inguinal canal.  Bulge with increased terminal pressure in the right inguinal canal especially when compared to the left.  MS: no gross musculoskeletal defects noted, no edema.  There is full range of motion of the right hip.  Mild tenderness with resisted straight leg raise.  Negative logroll.  No reproducible tenderness with hyperflexion of the hip.  No significant tenderness with internal rotation and abduction.  SKIN: no suspicious lesions or rashes  NEURO: Normal strength and tone, mentation intact and speech normal    X-ray right hip: Per my read there is no evidence of significant osteoarthritis, cam or pincer lesion.    Physician Attestation   I, Johnson Chowdhury, was present with the medical/SUSAN student who participated in the service and in the documentation of the note.  I have verified the history and personally performed the physical exam and medical decision making.  I agree with the assessment and plan of care as documented in the note.      Johnson Chowdhury PA-C

## 2022-03-24 NOTE — PATIENT INSTRUCTIONS
Hip XR looked good.     Get the ultrasound done.     We will follow-up with your lab work and when the recommendations are back from the E-consult for our GI team.

## 2022-03-24 NOTE — PROGRESS NOTES
ALL SMARTFIELDS MUST BE COMPLETED FOR PATIENT CARE AND BILLING    3/24/2022     E-Consult has been accepted.    Interprofessional consultation requested by:  Johnson Chowdhury PA-C      Clinical Question/Purpose: MY CLINICAL QUESTION IS: Patient with incidental finding of 2.5 cm pancreatic cyst.  He is asymptomatic.  LFTs and lipase are pending but suspect these to be normal.  What further testing or monitoring do recommend for this incidental pancreatic cyst?    Patient assessment and information reviewed:   1. 2.5 cystic lesion in uncinate process of pancreas    Given size would recommend patient undergo EUS to further evaluate and obtain FNA if needed.    Recommendations:   -GI will contact the patient to schedule EUS (endoscopic ultrasound)  -Please notify the patient to expect call to schedule EUS       The recommendations provided in this E-Consult are based on a review of clinical data pertinent to the clinical question presented, without a review of the patient's complete medical record or, the benefit of a comprehensive in-person or virtual patient evaluation. This consultation should not replace the clinical judgement and evaluation of the provider ordering this E-Consult. Any new clinical issues, or changes in patient status since the filing of this E-Consult will need to be taken into account when assessing these recommendations. Please contact me if you have further questions.    My total time spent reviewing clinical information and formulating assessment was 15 minutes.    Report sent automatically to requesting provider once signed.     Praveen Aguila MD

## 2022-03-25 ENCOUNTER — TELEPHONE (OUTPATIENT)
Dept: GASTROENTEROLOGY | Facility: CLINIC | Age: 42
End: 2022-03-25
Payer: COMMERCIAL

## 2022-03-25 ENCOUNTER — PATIENT OUTREACH (OUTPATIENT)
Dept: GASTROENTEROLOGY | Facility: CLINIC | Age: 42
End: 2022-03-25
Payer: COMMERCIAL

## 2022-03-25 DIAGNOSIS — K86.2 PANCREATIC CYST: Primary | ICD-10-CM

## 2022-03-25 DIAGNOSIS — Z11.59 ENCOUNTER FOR SCREENING FOR OTHER VIRAL DISEASES: Primary | ICD-10-CM

## 2022-03-25 NOTE — TELEPHONE ENCOUNTER
Referred by Dr Aguila to Dr. Rosenbaum for pancreatic cyst:  EUS with FNA next available with me in [facility]   Called to discuss with pt, he's says to schedule    Orders placed, message sent to scheduling    ML

## 2022-03-25 NOTE — TELEPHONE ENCOUNTER
Screening Questions  BlueKIND OF PREP RedLOCATION [review exclusion criteria] GreenSEDATION TYPE  1. Have you had a positive covid test in the last 90 days? N     2. Are you active on mychart? N    3. What insurance is in the chart? [employer]     3.   Ordering/Referring Provider:     4. BMI 29.1 [BMI OVER 40-EXTENDED PREP]  If greater than 40 review exclusion criteria [PAC APPT IF @ [facility]]        5.  Respiratory Screening :  [If yes to any of the following HOSPITAL setting only]     Do you use daily home oxygen? N    Do you have mod to severe Obstructive Sleep Apnea? N  [OKAY @ [facility] SH PH RI]   Do you have Pulmonary Hypertension? N     Do you have UNCONTROLLED asthma? N        6.   Have you had a heart or lung transplant? N      7.   Are you currently on dialysis? N [ If yes, G-PREP & HOSPITAL setting only]     8.   Do you have chronic kidney disease? N [ If yes, G-PREP ]    9.   Have you had a stroke or Transient ischemic attack (TIA - aka  mini stroke ) within 6 months?  N (If yes, please review exclusion criteria)    10.   In the past 6 months, have you had any heart related issues including cardiomyopathy or heart attack? N           If yes, did it require cardiac stenting or other implantable device? N      11.   Do you have any implantable devices in your body (pacemaker, defib, LVAD)? N (If yes, please review exclusion criteria)    12.   Do you take nitroglycerin? N           If yes, how often? N  (if yes, HOSPITAL setting ONLY)    13.   Are you currently taking any blood thinners? N           [IF YES, INFORM PATIENT TO FOLLOW UP W/ ORDERING PROVIDER FOR BRIDGING INSTRUCTIONS]     14.   Do you have a diagnosis of diabetes? N   [ If yes, G-PREP ]    15.   [FEMALES] Are you currently pregnant?     If yes, how many weeks?     16.   Are you taking any prescription pain medications on a routine schedule?  N  [ If yes, EXTENDED PREP.] [If yes, MAC]    17.   Do you have any chemical dependencies  such as alcohol, street drugs, or methadone?  N [If yes, MAC]    18.   Do you have any history of post-traumatic stress syndrome, severe anxiety or history of psychosis?  N  [If yes, MAC]    19.   Do you have a significant intellectual disability?  N [If yes, MAC]    20.   Do you transfer independently?  Y    21.  On a regular basis do you go 3-5 days between bowel movements? N   [ If yes, EXTENDED PREP.]    22.   Preferred LOCAL Pharmacy for Pre Prescription   [facility]      Scheduling Details      Caller : Junior  (Please ask for phone number if not scheduled by patient)    Type of Procedure Scheduled: EUS  Which Colonoscopy Prep was Sent?:   ALESSANDRA CF PATIENTS & GROEN'S PATIENTS NEEDS EXTENDED PREP  Surgeon:   Date of Procedure: 4-19  Location: [facility]      Sedation Type: MAC  Conscious Sedation- Needs  for 6 hours after the procedure  MAC/General-Needs  for 24 hours after procedure    Pre-op Required at [facility] [place], [place] and OR for MAC sedation: Y - pre-op [facility] 4-15  (advise patient they will need a pre-op prior to procedure -)      Informed patient they will need an adult  Y  Cannot take any type of public or medical transportation alone    Pre-Procedure Covid test to be completed at [facility] or Externally: Y - CSC     Confirmed Nurse will call to complete assessment Y    Additional comments:

## 2022-03-28 NOTE — TELEPHONE ENCOUNTER
FUTURE VISIT INFORMATION      SURGERY INFORMATION:    Date: 4/19/22    Location:  gi    Surgeon:  Guru Kirt Rosenbaum MD    Anesthesia Type:  MAC    Procedure: ENDOSCOPIC ULTRASOUND, ESOPHAGOSCOPY / UPPER GASTROINTESTINAL TRACT (GI)    RECORDS REQUESTED FROM:        Primary Care Provider: Johnson Chowdhury PA-C  - [facility]

## 2022-03-30 ENCOUNTER — HOSPITAL ENCOUNTER (OUTPATIENT)
Dept: ULTRASOUND IMAGING | Facility: CLINIC | Age: 42
Discharge: HOME | End: 2022-03-30
Attending: PHYSICIAN ASSISTANT | Admitting: PHYSICIAN ASSISTANT
Payer: COMMERCIAL

## 2022-03-30 DIAGNOSIS — R10.31 RIGHT INGUINAL PAIN: ICD-10-CM

## 2022-03-30 PROCEDURE — 76705 ECHO EXAM OF ABDOMEN: CPT

## 2022-04-11 NOTE — PROGRESS NOTES
ASSESSMENT & PLAN    Junior was seen today for pain.    Diagnoses and all orders for this visit:    Pelvic pain  -     MR Pelvis Bone wo Contrast; Future  -     nabumetone (RELAFEN) 500 MG tablet; Take 1 tablet (500 mg) by mouth 2 times daily as needed for moderate pain    Right inguinal pain  -     Orthopedic  Referral  -     MR Pelvis Bone wo Contrast; Future  -     nabumetone (RELAFEN) 500 MG tablet; Take 1 tablet (500 mg) by mouth 2 times daily as needed for moderate pain      This issue is acute on chronic and Worsening.    # Pubic Symphysis Pain: Symptoms ongoing over the past year and a half with pain over the groin right greater than left.  He has had extensive work-up thus far including a inguinal ultrasound, hip x-ray and CT they are all negative.  Today he is having tenderness to palpation over the pubic symphysis as well as the origins of the adductor muscles on both sides.  My concern is he may have irritation of the pubic symphysis is causing his chronic pain.  Given this plan to give further imaging and follow-up in clinic to go over the results.  He is also concerned about chronic neck and back pain and wants to start follow-up for this.  We will readdress this on repeat visit.  Image Findings: reviewed groin ultrasound, right hip x-ray, pelvis CT ordered  Treatment: Activities as tolerated, home exercises given today  Job: no restrictions  Medications/Injections: Relafen ordered for pain stop taking ibuprofen, can take Flexeril at night, no steroid injection currently  Follow-up: After pelvis MRI to go over the results and evaluation of chronic neck and back pain    Ty Navarro MD  [facility] SPORTS MEDICINE [facility]    -----  Chief Complaint   Patient presents with     Pelvis - Pain       SUBJECTIVE  Junior Rodriguez is a/an 41 year old male who is seen in consultation at the request of  Johnson Chowdhury PA-C for evaluation of right inguinal pain and left neck pain.    The  patient is seen by themselves.    Onset: Has noticed pain for the past 1.5 years. About a month ago, noticed a burning sensation in the groin and has been having an increase in frequency of symptoms.  Location of Pain: Right over left pain in the groin. Feels like a muscle tearing. Pain occurs during the activity.  Has had a CT, hip x-ray, groin ultrasound.  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  Symptoms worse with standing, improved with sitting.  Notes burning sensation.  No changes in weight.   Better with: nothing  Treatments tried: ice, heat, ibuprofen  Associated symptoms: pain, some redness and bruising    Also would like to discuss his neck stiffness today over his posterior left neck.    Orthopedic/Surgical history: No: groin/hip. Chronic neck and back issues.  Social History/Occupation: Seasonal summer worker.    No family history pertinent to patient's problem today.      REVIEW OF SYSTEMS:  Review of Systems  Constitutional, HEENT, cardiovascular, pulmonary, gi and gu systems are negative, except as otherwise noted.    OBJECTIVE:  /80   Wt 99.5 kg (219 lb 4.8 oz)   BMI 30.37 kg/m     General: healthy, alert and in no distress  HEENT: no scleral icterus or conjunctival erythema  Skin: no suspicious lesions or rash. No jaundice.  CV: distal perfusion intact    Resp: normal respiratory effort without conversational dyspnea   Psych: normal mood and affect  Gait: normal steady gait with appropriate coordination and balance    Neuro: Normal light sensory exam of bilateral lower extremities    BILATERAL HIP  Inspection:    No swelling, bruising, discoloration, or obvious deformity or asymmetry  Palpation:    Tender about the pubic symphysis and adductor group origin. Otherwise all other landmarks are nontender.    Crepitus is Absent  Active Range of Motion:     Flexion full, extension full /  IR full / ER  full  Strength:    Flexion 5/5 / extension 5/5 / adduction 5/5 / abduction 5/5  Special Tests:    Positive: Pain over pubic Symphysis    Negative: XIMENA KrugerER, anterior impingement (FADIR)        RADIOLOGY:  I independently, visualized and reviewed these images with the patient    Recent Results (from the past 744 hour(s))   CT Abdomen Pelvis w Contrast    Narrative    CT ABDOMEN PELVIS WITH CONTRAST 3/11/2022 4:38 PM    CLINICAL HISTORY: Abdominal pain. Right lower quadrant abdominal pain,  appendicitis suspected (Age >= 14y). Rule out hernia versus  appendicitis.     TECHNIQUE: CT scan of the abdomen and pelvis was performed following  injection of IV contrast. Multiplanar reformats were obtained. Dose  reduction techniques were used.  CONTRAST: 100 mL Isovue 370    COMPARISON: None.    FINDINGS:   LOWER CHEST: No infiltrates or effusions.    HEPATOBILIARY: Diffuse hepatic steatosis. No significant mass. No bile  duct dilatation. No calcified gallstones.    PANCREAS: 2.5 cm cystic lesion in the uncinate process of the  pancreas. Pancreas otherwise unremarkable.    SPLEEN: Normal size.    ADRENAL GLANDS: No significant nodules.    KIDNEYS/BLADDER: No significant mass, stones, or hydronephrosis. There  are simple or benign cysts. No follow up is needed.    BOWEL: No obstruction or inflammatory change. Normal appendix. No  diverticulitis.    PELVIC ORGANS: No pelvic masses.    ADDITIONAL FINDINGS: No ascites.    MUSCULOSKELETAL: No frankly destructive bony lesions.      Impression    IMPRESSION: No acute process demonstrated.     LESA BLACKWELL MD         SYSTEM ID:  [id number]   XR Hip Right 2-3 Views    Narrative    HIP RIGHT TWO - THREE VIEWS   3/24/2022 9:21 AM     HISTORY: Hip pain, right.    COMPARISON: None.      Impression    IMPRESSION: Normal joint spacing. No evidence of acute fracture.    GRETTA SUAZO MD         SYSTEM ID:  [id number]   US Hernia Evaluation    Narrative    EXAM: US HERNIA  EVALUATION  LOCATION: [facility]  DATE/TIME: 3/30/2022 5:51 PM    INDICATION: Right inguinal bulge with associated tenderness.  COMPARISON: None.    TECHNIQUE: Transabdominal ultrasound of the groin during rest and Valsalva.      Impression    IMPRESSION:  1.  Focused ultrasound scanning in the right inguinal region demonstrates no evidence of hernia.         Review of external notes as documented elsewhere in note  Review of the result(s) of each unique test - pelvis CT, hip x-rays, groin ultrasound

## 2022-04-12 ENCOUNTER — TELEPHONE (OUTPATIENT)
Dept: GASTROENTEROLOGY | Facility: CLINIC | Age: 42
End: 2022-04-12

## 2022-04-12 ENCOUNTER — ANCILLARY PROCEDURE (OUTPATIENT)
Dept: GENERAL RADIOLOGY | Facility: CLINIC | Age: 42
End: 2022-04-12
Attending: PHYSICIAN ASSISTANT
Payer: COMMERCIAL

## 2022-04-12 ENCOUNTER — OFFICE VISIT (OUTPATIENT)
Dept: RHEUMATOLOGY | Facility: CLINIC | Age: 42
End: 2022-04-12
Payer: COMMERCIAL

## 2022-04-12 VITALS
WEIGHT: 209 LBS | BODY MASS INDEX: 29.26 KG/M2 | HEIGHT: 71 IN | DIASTOLIC BLOOD PRESSURE: 86 MMHG | SYSTOLIC BLOOD PRESSURE: 120 MMHG

## 2022-04-12 DIAGNOSIS — M25.50 MULTIPLE JOINT PAIN: ICD-10-CM

## 2022-04-12 DIAGNOSIS — R76.8 POSITIVE ANA (ANTINUCLEAR ANTIBODY): ICD-10-CM

## 2022-04-12 DIAGNOSIS — I73.00 RAYNAUD'S DISEASE WITHOUT GANGRENE: ICD-10-CM

## 2022-04-12 DIAGNOSIS — M25.50 MULTIPLE JOINT PAIN: Primary | ICD-10-CM

## 2022-04-12 LAB
C3 SERPL-MCNC: 130 MG/DL (ref 81–157)
C4 SERPL-MCNC: 28 MG/DL (ref 13–39)

## 2022-04-12 PROCEDURE — 99204 OFFICE O/P NEW MOD 45 MIN: CPT | Mod: 25 | Performed by: PHYSICIAN ASSISTANT

## 2022-04-12 PROCEDURE — 86225 DNA ANTIBODY NATIVE: CPT | Performed by: PHYSICIAN ASSISTANT

## 2022-04-12 PROCEDURE — 86160 COMPLEMENT ANTIGEN: CPT | Performed by: PHYSICIAN ASSISTANT

## 2022-04-12 PROCEDURE — 86235 NUCLEAR ANTIGEN ANTIBODY: CPT | Performed by: PHYSICIAN ASSISTANT

## 2022-04-12 PROCEDURE — 36415 COLL VENOUS BLD VENIPUNCTURE: CPT | Performed by: PHYSICIAN ASSISTANT

## 2022-04-12 PROCEDURE — 73130 X-RAY EXAM OF HAND: CPT | Mod: 59 | Performed by: RADIOLOGY

## 2022-04-12 NOTE — PROGRESS NOTES
Rheumatology Clinic Visit  [facility]  ANDRE Mehta MRN# [id number]   YOB: 1980 Age: 41 year old   Date of Visit: 04/12/2022  Primary care provider: Johnson Chowdhury          Assessment and Plan:     1.  Multiple joint pain  2.  Positive VALERIE  3.  Raynaud's disease without gangrene    Patient presents today with pain in multiple joints including his knees, ankles, wrists, hands, elbows, shoulders, neck and back.  This is been ongoing and progressively worsening over the last 5 to 8 years.  He does notice stiffness most notably in his hands in the morning for 30 to 60 minutes.  He then has significant pain in his hands and other joints after increased activity.  He also notices swelling in his hands after activity as well.  Patient also reports that his fingers will become cold and turn bright white at times.  He has not noticed any ulcerations.  Physical examination was unremarkable.  He has full range of motion of his joints.  No synovitis, tenosynovitis, dactylitis, or enthesitis.  No skin rashes or mouth sores.  Laboratory examination from 2/15/2022 and 3/10/2022 was reviewed and showed a positive VALERIE at both dates with a titer decreasing from 1:160 in February to 1:80 in March.  The x-rays that he had of his joints were reviewed as well.    Reviewed with the patient that the likelihood of his joint pain being secondary to an autoimmune disorder such as inflammatory arthritis or lupus is remote.  However we will do further laboratory work given the positive VALERIE.  He has no synovitis, dactylitis or tenosynovitis on examination today.  Given the stiffness that he gets in his hands in the mornings we will also check x-rays of his hands.  We did briefly talk about the medication duloxetine for his multiple joint pain.  He can review this medication with his primary care provider.  He does report symptoms consistent with Raynaud's phenomenon.  He does not have any  "ulcerations on his fingertips.  Recommend that he continue trying to keep his digits warm and the cold winter.  Next winter he may want to consider the medication Norvasc if he has a lot of discomfort secondary to these symptoms.    Plan:     1. Schedule follow-up with Leydi Jasmine PA-C as needed.   2. Imaging: xray hands  3. Labs: will check CRISPIN panel, complement levels, DNA-ds (these are checking for lupus)  4. Medication recommendations:   a. Discuss cymbalta with your primary care provider  b. May want to consider using Norvasc in the winter for symptoms of Raynauds    Leydi Jasmine, ANDRE  Rheumatology         History of Present Illness:   Junior Rodriguez presents for evaluation multiple joint pain and positive VALERIE.    He reports pain in multiple joints, knees, ankles, wrists, elbows, shoulders, neck and back. He states that his hips bother him at times. He states that the has had this for the last 5-8 years. The pain has been gradual over the years. He has noticed swelling in he knees and elbows at times. He states that for the first 30-60 minutes in the morning he has stiffness, most notably in the hands. He states that towards the end of the day after he has been active the pain starts coming on \"strong\". He denies any skin rashes or mouth sores. No sun sensitivity. No history of blood clots or seizures. No dry eyes or dry mouth. He reports having color changes in his fingers in the winter. He states that his fingers will turn white at times when they are cold. No ulcers to the finger tips. No coughing, SOB, chest pain or frequent infections. He uses Ibuprofen 800mg 2-6 tablets daily and does find this helpful, slowly seems to be lessening in effectiveness.     No personal history of psoriasis, ulcerative colitis or crohn's. He reports a family history of MS. No autoimmune disorders that he is aware of. He has a cyst on his pancreas and will be seeing GI for this-he will be getting a biopsy of this under EUS. "          Review of Systems:     Constitutional: negative  Skin: negative  Eyes: negative  Ears/Nose/Throat: negative  Respiratory: No shortness of breath, dyspnea on exertion, cough, or hemoptysis  Cardiovascular: negative  Gastrointestinal: As above  Genitourinary: negative  Musculoskeletal: As above  Neurologic: negative  Psychiatric: negative  Hematologic/Lymphatic/Immunologic: negative  Endocrine: negative         Active Problem List:     Patient Active Problem List    Diagnosis Date Noted     CARDIOVASCULAR SCREENING; LDL GOAL LESS THAN 130 07/02/2015     Priority: Medium     Other specified disorder of male genital organs(608.89) 12/08/2014     Priority: Medium     Dyspnea and respiratory abnormality 01/15/2013     Priority: Medium     Tobacco use disorder 02/26/2009     Priority: Medium     Herpes zoster 02/05/2009     Priority: Medium     Overview:   First in highschool.  Repeatedly happening since then.  10 occurances  Occur on right side of face.              Past Medical History:     Past Medical History:   Diagnosis Date     Epididymitis      Past Surgical History:   Procedure Laterality Date     DENERVATION OF SPERMATIC CORD MICROSURGICAL Left 4/6/2015    Procedure: DENERVATION OF SPERMATIC CORD MICROSURGICAL;  Surgeon: Kiko Morgan MD;  Location: [facility] OR     EPIDIDYMECTOMY  8/2014     VASECTOMY  2/2013            Social History:     Social History     Socioeconomic History     Marital status:      Spouse name: Not on file     Number of children: Not on file     Years of education: Not on file     Highest education level: Not on file   Occupational History     Not on file   Tobacco Use     Smoking status: Current Every Day Smoker     Packs/day: 1.00     Years: 20.00     Pack years: 20.00     Smokeless tobacco: Never Used   Vaping Use     Vaping Use: Never used   Substance and Sexual Activity     Alcohol use: No     Drug use: No     Sexual activity: Yes     Partners: Female     Birth  control/protection: Surgical   Other Topics Concern     Parent/sibling w/ CABG, MI or angioplasty before 65F 55M? Yes     Comment: uncle at age 29   Social History Narrative     Not on file     Social Determinants of Health     Financial Resource Strain: Not on file   Food Insecurity: Not on file   Transportation Needs: Not on file   Physical Activity: Not on file   Stress: Not on file   Social Connections: Not on file   Intimate Partner Violence: Not on file   Housing Stability: Not on file          Family History:     Family History   Problem Relation Age of Onset     Neurologic Disorder Mother         MS     Cancer Mother         lung     Chronic Obstructive Pulmonary Disease Maternal Grandmother      Alzheimer Disease Paternal Grandmother      Prostate Cancer No family hx of      Cancer - colorectal No family hx of             Allergies:   No Known Allergies         Medications:     Current Outpatient Medications   Medication Sig Dispense Refill     cyclobenzaprine (FLEXERIL) 5 MG tablet Take 1 tablet (5 mg) by mouth 3 times daily as needed for muscle spasms 30 tablet 0     ibuprofen (ADVIL/MOTRIN) 800 MG tablet Take 1 tablet (800 mg) by mouth every 8 hours as needed for moderate pain 180 tablet 3            Physical Exam:   There were no vitals taken for this visit.  Wt Readings from Last 6 Encounters:   03/24/22 95 kg (209 lb 6.4 oz)   03/10/22 93.4 kg (206 lb)   02/15/22 93.4 kg (206 lb)   11/18/20 84.5 kg (186 lb 3.2 oz)   06/22/15 98.4 kg (217 lb)   05/04/15 99.8 kg (220 lb 1.6 oz)     Constitutional: well-developed, appearing stated age; cooperative  Eyes: nl EOM, PERRLA, vision, conjunctiva, sclera  ENT: nl external ears, nose, hearing, lips, teeth, gums, throat. No mucositis.   No mucous membrane lesions, normal saliva pool  Neck: no mass or thyroid enlargement  Resp: lungs clear to auscultation, nl to palpation  CV: RRR, no murmurs, rubs or gallops, no edema  Lymph: no cervical, supraclavicular, or  epitrochlear nodes  MS: The  shoulder, elbow, wrist, MCP/PIP/DIP, spine, hip, knee, ankle, and foot MTP/IP joints were examined and found normal. No active synovitis or altered joint anatomy. Full joint ROM. Normal  strength. No dactylitis,  tenosynovitis, enthesopathy.   Skin: no nail pitting, alopecia, rash, nodules or lesions.   Neuro: nl cranial nerves.   Psych: nl judgement, orientation, memory, affect.           Data:   Imagin/15/2022 xray knee bilateral  Impression:   1.  Normal bilateral knees. Normal joint alignment and spacing. No  joint effusions. No fracture, bone lesion, or erosive change.    3/24/2022 xray right hip  IMPRESSION: Normal joint spacing. No evidence of acute fracture.       Laboratory:  3/10/2022  VALERIE borderline positive with a speckled pattern and titer of 1:80  UA negative for protein    2/15/2022  CCP antibodies 2.0  Rheumatoid factor 9  CRP <2.9  ESR 8  Hep C non reactive  VALERIE positive with a speckled pattern and a titer of 1:160

## 2022-04-12 NOTE — TELEPHONE ENCOUNTER
Attempted to contact patient regarding upcoming EUS procedure on 4.19.22 for pre assessment questions. No answer.     Left message to return call to [phone] #2    Covid test scheduled: 4.15.22    Pre op exam scheduled: 4.15.22 at PAC    Arrival time: 0830    Facility location: [facility]    Sedation type: MAC    Indication for procedure: Pancreatic cyst    Anticoagulants: No.     Pricilla Law RN

## 2022-04-12 NOTE — PATIENT INSTRUCTIONS
After Visit Instructions:     Thank you for coming to [facility] for your care. It is my goal to partner with you to help you reach your optimal state of health.       Diagnosis:  Multiple joint pain and positive VALERIE: unknown exact cause of your joint pain. It does not appear to be an inflammatory arthritis. You could consider trying a medication called Cymbalta (duloxetine) for joint pain. Would recommend that you discuss this with your primary care provider. Positive VALERIE is of unknown significance. We will check further labs to rule out lupus.   Raynaud's: in the winter you may want to consider Norvasc to help with these symptoms     Plan:     Schedule follow-up with Leydi Jasmine PA-C as needed.   Imaging: xray hands  Labs: will check CRISPIN panel, complement levels, DNA-ds (these are checking for lupus)  Medication recommendations:   Discuss cymbalta with your primary care provider  May want to consider using Norvasc in the winter for symptoms of Raynauds      Leydi Jasmine PA-C  [facility]  [facility]    Contact information: [facility]  Clinic Number:  [phone]  Please call or send a [employer] message with any questions about your care

## 2022-04-13 ENCOUNTER — TELEPHONE (OUTPATIENT)
Dept: RHEUMATOLOGY | Facility: CLINIC | Age: 42
End: 2022-04-13
Payer: COMMERCIAL

## 2022-04-13 LAB
DSDNA AB SER-ACNC: 0.9 IU/ML
ENA SM IGG SER IA-ACNC: 1.9 U/ML
ENA SM IGG SER IA-ACNC: NEGATIVE
ENA SS-A AB SER IA-ACNC: <0.5 U/ML
ENA SS-A AB SER IA-ACNC: NEGATIVE
ENA SS-B IGG SER IA-ACNC: <0.6 U/ML
ENA SS-B IGG SER IA-ACNC: NEGATIVE
U1 SNRNP IGG SER IA-ACNC: <1.1 U/ML
U1 SNRNP IGG SER IA-ACNC: NEGATIVE

## 2022-04-13 NOTE — TELEPHONE ENCOUNTER
Pre assessment questions completed for upcoming EUS procedure scheduled on 4.19.2022    COVID test scheduled 4.15.2022    Pre-op: 4.15.2022 PAC with POLLO [place], CNS    Reviewed procedural arrival time 0830 and facility location [facility].    Designated  policy reviewed. Instructed to have someone stay 24 hours post procedure.     Anticoagulation/blood thinners? no    Electronic implanted devices? no    Reviewed EUS prep instructions with patient.     Patient verbalized understanding and had no questions or concerns at this time.    Swathi Flores RN

## 2022-04-13 NOTE — TELEPHONE ENCOUNTER
Please let patient know that his labs were normal as was his x-ray.    Leydi Jasmine PA-C on 4/13/2022 at 3:11 PM

## 2022-04-15 ENCOUNTER — ANESTHESIA EVENT (OUTPATIENT)
Dept: GASTROENTEROLOGY | Facility: CLINIC | Age: 42
End: 2022-04-15
Payer: COMMERCIAL

## 2022-04-15 ENCOUNTER — OFFICE VISIT (OUTPATIENT)
Dept: ORTHOPEDICS | Facility: CLINIC | Age: 42
End: 2022-04-15
Payer: COMMERCIAL

## 2022-04-15 ENCOUNTER — PRE VISIT (OUTPATIENT)
Dept: SURGERY | Facility: CLINIC | Age: 42
End: 2022-04-15
Payer: COMMERCIAL

## 2022-04-15 ENCOUNTER — OFFICE VISIT (OUTPATIENT)
Dept: SURGERY | Facility: CLINIC | Age: 42
End: 2022-04-15
Payer: COMMERCIAL

## 2022-04-15 ENCOUNTER — LAB (OUTPATIENT)
Dept: LAB | Facility: CLINIC | Age: 42
End: 2022-04-15
Payer: COMMERCIAL

## 2022-04-15 VITALS
DIASTOLIC BLOOD PRESSURE: 90 MMHG | OXYGEN SATURATION: 98 % | HEIGHT: 71 IN | RESPIRATION RATE: 16 BRPM | TEMPERATURE: 98 F | BODY MASS INDEX: 30.7 KG/M2 | HEART RATE: 62 BPM | SYSTOLIC BLOOD PRESSURE: 137 MMHG | WEIGHT: 219.3 LBS

## 2022-04-15 VITALS — BODY MASS INDEX: 30.37 KG/M2 | SYSTOLIC BLOOD PRESSURE: 122 MMHG | WEIGHT: 219.3 LBS | DIASTOLIC BLOOD PRESSURE: 80 MMHG

## 2022-04-15 DIAGNOSIS — R10.2 PELVIC PAIN: Primary | ICD-10-CM

## 2022-04-15 DIAGNOSIS — Z11.59 ENCOUNTER FOR SCREENING FOR OTHER VIRAL DISEASES: ICD-10-CM

## 2022-04-15 DIAGNOSIS — R10.31 RIGHT INGUINAL PAIN: ICD-10-CM

## 2022-04-15 DIAGNOSIS — Z01.818 PREOP EXAMINATION: Primary | ICD-10-CM

## 2022-04-15 DIAGNOSIS — K86.2 PANCREATIC CYST: ICD-10-CM

## 2022-04-15 LAB — SARS-COV-2 RNA RESP QL NAA+PROBE: NEGATIVE

## 2022-04-15 PROCEDURE — U0003 INFECTIOUS AGENT DETECTION BY NUCLEIC ACID (DNA OR RNA); SEVERE ACUTE RESPIRATORY SYNDROME CORONAVIRUS 2 (SARS-COV-2) (CORONAVIRUS DISEASE [COVID-19]), AMPLIFIED PROBE TECHNIQUE, MAKING USE OF HIGH THROUGHPUT TECHNOLOGIES AS DESCRIBED BY CMS-2020-01-R: HCPCS | Mod: 90 | Performed by: PATHOLOGY

## 2022-04-15 PROCEDURE — 99204 OFFICE O/P NEW MOD 45 MIN: CPT | Performed by: FAMILY MEDICINE

## 2022-04-15 PROCEDURE — U0005 INFEC AGEN DETEC AMPLI PROBE: HCPCS | Mod: 90 | Performed by: PATHOLOGY

## 2022-04-15 PROCEDURE — 99203 OFFICE O/P NEW LOW 30 MIN: CPT | Performed by: CLINICAL NURSE SPECIALIST

## 2022-04-15 PROCEDURE — 99000 SPECIMEN HANDLING OFFICE-LAB: CPT | Performed by: PATHOLOGY

## 2022-04-15 RX ORDER — NABUMETONE 500 MG/1
500 TABLET, FILM COATED ORAL 2 TIMES DAILY PRN
Qty: 30 TABLET | Refills: 0 | Status: SHIPPED | OUTPATIENT
Start: 2022-04-15 | End: 2022-04-29

## 2022-04-15 ASSESSMENT — PAIN SCALES - GENERAL
PAINLEVEL: NO PAIN (0)
PAINLEVEL: MILD PAIN (2)

## 2022-04-15 ASSESSMENT — LIFESTYLE VARIABLES: TOBACCO_USE: 1

## 2022-04-15 NOTE — PATIENT INSTRUCTIONS
# Pubic Symphysis Pain: Symptoms ongoing over the past year and a half with pain over the groin right greater than left.  He has had extensive work-up thus far including a inguinal ultrasound, hip x-ray and CT they are all negative.  Today he is having tenderness to palpation over the pubic symphysis as well as the origins of the adductor muscles on both sides.  My concern is he may have irritation of the pubic symphysis is causing his chronic pain.  Given this plan to give further imaging and follow-up in clinic to go over the results.  He is also concerned about chronic neck and back pain and wants to start follow-up for this.  We will readdress this on repeat visit.  Image Findings: reviewed groin ultrasound, right hip x-ray, pelvis CT ordered  Treatment: Activities as tolerated, home exercises given today  Job: no restrictions  Medications/Injections: Relafen ordered for pain stop taking ibuprofen, can take Flexeril at night, no steroid injection currently  Follow-up: After pelvis MRI to go over the results and evaluation of chronic neck and back pain    Please call [phone]   Ask for my team if you have any questions or concerns    If you have not yet received the influenza vaccine but would like to get one, please call  [phone] or you can schedule via [employer]    It was great seeing you today!    Ty Navarro MD, [facility]

## 2022-04-15 NOTE — LETTER
4/15/2022         RE: Junior Rodriguez  [address] [facility] [address]  [facility] [address]        Dear Colleague,    Thank you for referring your patient, Junior Rodriguez, to the [facility] [place]. Please see a copy of my visit note below.    ASSESSMENT & PLAN    Junior was seen today for pain.    Diagnoses and all orders for this visit:    Pelvic pain  -     MR Pelvis Bone wo Contrast; Future  -     nabumetone (RELAFEN) 500 MG tablet; Take 1 tablet (500 mg) by mouth 2 times daily as needed for moderate pain    Right inguinal pain  -     Orthopedic  Referral  -     MR Pelvis Bone wo Contrast; Future  -     nabumetone (RELAFEN) 500 MG tablet; Take 1 tablet (500 mg) by mouth 2 times daily as needed for moderate pain      This issue is acute on chronic and Worsening.    # Pubic Symphysis Pain: Symptoms ongoing over the past year and a half with pain over the groin right greater than left.  He has had extensive work-up thus far including a inguinal ultrasound, hip x-ray and CT they are all negative.  Today he is having tenderness to palpation over the pubic symphysis as well as the origins of the adductor muscles on both sides.  My concern is he may have irritation of the pubic symphysis is causing his chronic pain.  Given this plan to give further imaging and follow-up in clinic to go over the results.  He is also concerned about chronic neck and back pain and wants to start follow-up for this.  We will readdress this on repeat visit.  Image Findings: reviewed groin ultrasound, right hip x-ray, pelvis CT ordered  Treatment: Activities as tolerated, home exercises given today  Job: no restrictions  Medications/Injections: Relafen ordered for pain stop taking ibuprofen, can take Flexeril at night, no steroid injection currently  Follow-up: After pelvis MRI to go over the results and evaluation of chronic neck and back pain    Ty Navarro MD  [facility]  [place]    -----  Chief Complaint   Patient presents with     Pelvis - Pain       SUBJECTIVE  Juniormichael Rodriguez is a/an 41 year old male who is seen in consultation at the request of  Johnson Chowdhury PA-C for evaluation of right inguinal pain and left neck pain.    The patient is seen by themselves.    Onset: Has noticed pain for the past 1.5 years. About a month ago, noticed a burning sensation in the groin and has been having an increase in frequency of symptoms.  Location of Pain: Right over left pain in the groin. Feels like a muscle tearing. Pain occurs during the activity.  Has had a CT, hip x-ray, groin ultrasound.  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  Symptoms worse with standing, improved with sitting.  Notes burning sensation.  No changes in weight.   Better with: nothing  Treatments tried: ice, heat, ibuprofen  Associated symptoms: pain, some redness and bruising    Also would like to discuss his neck stiffness today over his posterior left neck.    Orthopedic/Surgical history: No: groin/hip. Chronic neck and back issues.  Social History/Occupation: Seasonal summer worker.    No family history pertinent to patient's problem today.      REVIEW OF SYSTEMS:  Review of Systems  Constitutional, HEENT, cardiovascular, pulmonary, gi and gu systems are negative, except as otherwise noted.    OBJECTIVE:  /80   Wt 99.5 kg (219 lb 4.8 oz)   BMI 30.37 kg/m     General: healthy, alert and in no distress  HEENT: no scleral icterus or conjunctival erythema  Skin: no suspicious lesions or rash. No jaundice.  CV: distal perfusion intact    Resp: normal respiratory effort without conversational dyspnea   Psych: normal mood and affect  Gait: normal steady gait with appropriate coordination and balance    Neuro: Normal light sensory exam of bilateral lower extremities    BILATERAL HIP  Inspection:    No  swelling, bruising, discoloration, or obvious deformity or asymmetry  Palpation:    Tender about the pubic symphysis and adductor group origin. Otherwise all other landmarks are nontender.    Crepitus is Absent  Active Range of Motion:     Flexion full, extension full / IR full / ER  full  Strength:    Flexion 5/5 / extension 5/5 / adduction 5/5 / abduction 5/5  Special Tests:    Positive: Pain over pubic Symphysis    Negative: Logroll, ESTER, anterior impingement (FADIR)        RADIOLOGY:  I independently, visualized and reviewed these images with the patient    Recent Results (from the past 744 hour(s))   CT Abdomen Pelvis w Contrast    Narrative    CT ABDOMEN PELVIS WITH CONTRAST 3/11/2022 4:38 PM    CLINICAL HISTORY: Abdominal pain. Right lower quadrant abdominal pain,  appendicitis suspected (Age >= 14y). Rule out hernia versus  appendicitis.     TECHNIQUE: CT scan of the abdomen and pelvis was performed following  injection of IV contrast. Multiplanar reformats were obtained. Dose  reduction techniques were used.  CONTRAST: 100 mL Isovue 370    COMPARISON: None.    FINDINGS:   LOWER CHEST: No infiltrates or effusions.    HEPATOBILIARY: Diffuse hepatic steatosis. No significant mass. No bile  duct dilatation. No calcified gallstones.    PANCREAS: 2.5 cm cystic lesion in the uncinate process of the  pancreas. Pancreas otherwise unremarkable.    SPLEEN: Normal size.    ADRENAL GLANDS: No significant nodules.    KIDNEYS/BLADDER: No significant mass, stones, or hydronephrosis. There  are simple or benign cysts. No follow up is needed.    BOWEL: No obstruction or inflammatory change. Normal appendix. No  diverticulitis.    PELVIC ORGANS: No pelvic masses.    ADDITIONAL FINDINGS: No ascites.    MUSCULOSKELETAL: No frankly destructive bony lesions.      Impression    IMPRESSION: No acute process demonstrated.     LESA BLACKWELL MD         SYSTEM ID:  [id number]   XR Hip Right 2-3 Views    Narrative    HIP RIGHT TWO -  THREE VIEWS   3/24/2022 9:21 AM     HISTORY: Hip pain, right.    COMPARISON: None.      Impression    IMPRESSION: Normal joint spacing. No evidence of acute fracture.    GRETTA SUAZO MD         SYSTEM ID:  [id number]   US Hernia Evaluation    Narrative    EXAM: US HERNIA EVALUATION  LOCATION: [facility]  DATE/TIME: 3/30/2022 5:51 PM    INDICATION: Right inguinal bulge with associated tenderness.  COMPARISON: None.    TECHNIQUE: Transabdominal ultrasound of the groin during rest and Valsalva.      Impression    IMPRESSION:  1.  Focused ultrasound scanning in the right inguinal region demonstrates no evidence of hernia.         Review of external notes as documented elsewhere in note  Review of the result(s) of each unique test - pelvis CT, hip x-rays, groin ultrasound         Again, thank you for allowing me to participate in the care of your patient.        Sincerely,        Ty Navarro MD

## 2022-04-15 NOTE — PATIENT INSTRUCTIONS
Preparing for Your Surgery      Name:  Junoir Rodriguez   MRN:  [id number]   :  1980   Today's Date:  4/15/2022       Arriving for surgery:  Surgery date:  22  Arrival time:  08:00 a.m.    Restrictions due to COVID 19       Effective 22 [facility] is implementing the following visitor policy:     1 person may accompany the patient through the Pre-Op process.      That same person may wait in the Surgery Waiting room, provided there is enough room to social distance         Inpatients are allowed 2 visitors per day for the duration of their stay.        Visitors must wear a mask.      Visitors must not be ill.      Visiting hours are 8 am to 8 pm.    [employer] parking is available for anyone with mobility limitations or disabilities.  ([place]  24 hours/ 7 days a week; [facility]  7 am- 3:30 pm, Mon- Fri)    Please come to:     [facility] [address]  [address] [place] [address]    - ? parking is available in front of the hospital      -    Please proceed to [address] on the 3rd floor. [phone]?     - ?If you are in need of directions, wheelchair or escort please stop at the Information Desk in the lobby.  Inform the information person that you are here for surgery; a wheelchair and escort to [address] will be provided.?     What can I eat or drink?  -  Please follow dietary guidelines per your surgeon's instructions (reference [facility] GI letter)  -  You may have clear liquids until 2 hours before surgery. (Until 08:00 a.m.)    Examples of clear liquids:  Water  Clear broth  Juices (apple, white grape, white cranberry  and cider) without pulp  Noncarbonated, powder based beverages  (lemonade and Henry-Aid)  Sodas (Sprite, 7-Up, ginger ale and seltzer)  Coffee or tea (without milk or cream)  Gatorade        Which medicines can I take?    Hold Aspirin for 7 days before surgery.   Hold Multivitamins for 7 days before surgery.  Hold  Supplements for 7 days before surgery.  Hold Ibuprofen (Advil, Motrin) for 1 day before surgery--unless otherwise directed by surgeon.  Hold Naproxen (Aleve) for 4 days before surgery.    -  DO NOT take medications on the day of your procedure (per instructions from your GI surgeon)    How do I prepare myself?  - Please take 2 showers before surgery using Scrubcare or Hibiclens soap.    Use this soap only from the neck to your toes.     Leave the soap on your skin for one minute--then rinse thoroughly.      You may use your own shampoo and conditioner; no other hair products.   - Please remove all jewelry and body piercings.  - No lotions, deodorants or fragrance.  - No makeup or fingernail polish.   - Bring your ID and insurance card.    -If you have a Deep Brain Stimulator, Spinal Cord Stimulator or any neuro stimulator device---you must bring the remote control to the hospital     - All patients are required to have a Covid-19 test within 4 days of surgery/procedure.      -Patients will be contacted by the [facility] scheduling team within 1 week of surgery to make an appointment.      - Patients may call the Scheduling team at [phone] if they have not been scheduled within 4 days of  surgery.      ALL PATIENTS GOING HOME THE SAME DAY OF SURGERY ARE REQUIRED TO HAVE A RESPONSIBLE ADULT TO DRIVE AND BE IN ATTENDANCE WITH THEM FOR 24 HOURS FOLLOWING SURGERY.      Questions or Concerns:    - For any questions regarding the day of surgery or your hospital stay, please contact the Pre Admission Nursing Office at [phone].       - If you have health changes between today and your surgery please call your surgeon.       For questions after surgery please call your surgeons office.

## 2022-04-19 ENCOUNTER — ANESTHESIA (OUTPATIENT)
Dept: GASTROENTEROLOGY | Facility: CLINIC | Age: 42
End: 2022-04-19
Payer: COMMERCIAL

## 2022-04-19 ENCOUNTER — HOSPITAL ENCOUNTER (OUTPATIENT)
Facility: CLINIC | Age: 42
Discharge: HOME | End: 2022-04-19
Attending: INTERNAL MEDICINE | Admitting: INTERNAL MEDICINE
Payer: COMMERCIAL

## 2022-04-19 VITALS
SYSTOLIC BLOOD PRESSURE: 122 MMHG | DIASTOLIC BLOOD PRESSURE: 84 MMHG | TEMPERATURE: 97 F | RESPIRATION RATE: 16 BRPM | BODY MASS INDEX: 30.09 KG/M2 | HEIGHT: 71 IN | OXYGEN SATURATION: 100 % | WEIGHT: 214.95 LBS | HEART RATE: 68 BPM

## 2022-04-19 PROCEDURE — 43238 EGD US FINE NEEDLE BX/ASPIR: CPT | Performed by: INTERNAL MEDICINE

## 2022-04-19 PROCEDURE — 88173 CYTOPATH EVAL FNA REPORT: CPT | Mod: TC | Performed by: INTERNAL MEDICINE

## 2022-04-19 PROCEDURE — 258N000003 HC RX IP 258 OP 636

## 2022-04-19 PROCEDURE — 88173 CYTOPATH EVAL FNA REPORT: CPT | Mod: 26 | Performed by: PATHOLOGY

## 2022-04-19 PROCEDURE — 88305 TISSUE EXAM BY PATHOLOGIST: CPT | Mod: 26 | Performed by: PATHOLOGY

## 2022-04-19 PROCEDURE — 250N000009 HC RX 250

## 2022-04-19 PROCEDURE — 370N000017 HC ANESTHESIA TECHNICAL FEE, PER MIN: Performed by: INTERNAL MEDICINE

## 2022-04-19 PROCEDURE — 43242 EGD US FINE NEEDLE BX/ASPIR: CPT | Performed by: INTERNAL MEDICINE

## 2022-04-19 PROCEDURE — 250N000011 HC RX IP 250 OP 636

## 2022-04-19 PROCEDURE — 88172 CYTP DX EVAL FNA 1ST EA SITE: CPT | Mod: 26 | Performed by: PATHOLOGY

## 2022-04-19 RX ORDER — ONDANSETRON 4 MG/1
4 TABLET, ORALLY DISINTEGRATING ORAL EVERY 6 HOURS PRN
Status: CANCELLED | OUTPATIENT
Start: 2022-04-19

## 2022-04-19 RX ORDER — PROPOFOL 10 MG/ML
INJECTION, EMULSION INTRAVENOUS CONTINUOUS PRN
Status: DISCONTINUED | OUTPATIENT
Start: 2022-04-19 | End: 2022-04-19

## 2022-04-19 RX ORDER — ONDANSETRON 2 MG/ML
INJECTION INTRAMUSCULAR; INTRAVENOUS PRN
Status: DISCONTINUED | OUTPATIENT
Start: 2022-04-19 | End: 2022-04-19

## 2022-04-19 RX ORDER — NALOXONE HYDROCHLORIDE 0.4 MG/ML
0.2 INJECTION, SOLUTION INTRAMUSCULAR; INTRAVENOUS; SUBCUTANEOUS
Status: CANCELLED | OUTPATIENT
Start: 2022-04-19

## 2022-04-19 RX ORDER — NALOXONE HYDROCHLORIDE 0.4 MG/ML
0.4 INJECTION, SOLUTION INTRAMUSCULAR; INTRAVENOUS; SUBCUTANEOUS
Status: CANCELLED | OUTPATIENT
Start: 2022-04-19

## 2022-04-19 RX ORDER — SODIUM CHLORIDE, SODIUM LACTATE, POTASSIUM CHLORIDE, CALCIUM CHLORIDE 600; 310; 30; 20 MG/100ML; MG/100ML; MG/100ML; MG/100ML
INJECTION, SOLUTION INTRAVENOUS CONTINUOUS PRN
Status: DISCONTINUED | OUTPATIENT
Start: 2022-04-19 | End: 2022-04-19

## 2022-04-19 RX ORDER — LIDOCAINE 40 MG/G
CREAM TOPICAL
Status: DISCONTINUED | OUTPATIENT
Start: 2022-04-19 | End: 2022-04-19 | Stop reason: HOSPADM

## 2022-04-19 RX ORDER — FLUMAZENIL 0.1 MG/ML
0.2 INJECTION, SOLUTION INTRAVENOUS
Status: CANCELLED | OUTPATIENT
Start: 2022-04-19 | End: 2022-04-19

## 2022-04-19 RX ORDER — LIDOCAINE HYDROCHLORIDE 20 MG/ML
INJECTION, SOLUTION INFILTRATION; PERINEURAL PRN
Status: DISCONTINUED | OUTPATIENT
Start: 2022-04-19 | End: 2022-04-19

## 2022-04-19 RX ORDER — ONDANSETRON 2 MG/ML
4 INJECTION INTRAMUSCULAR; INTRAVENOUS EVERY 6 HOURS PRN
Status: CANCELLED | OUTPATIENT
Start: 2022-04-19

## 2022-04-19 RX ORDER — FENTANYL CITRATE 50 UG/ML
INJECTION, SOLUTION INTRAMUSCULAR; INTRAVENOUS PRN
Status: DISCONTINUED | OUTPATIENT
Start: 2022-04-19 | End: 2022-04-19

## 2022-04-19 RX ORDER — LEVOFLOXACIN 5 MG/ML
INJECTION, SOLUTION INTRAVENOUS PRN
Status: DISCONTINUED | OUTPATIENT
Start: 2022-04-19 | End: 2022-04-19

## 2022-04-19 RX ORDER — LIDOCAINE HYDROCHLORIDE 20 MG/ML
SOLUTION OROPHARYNGEAL PRN
Status: DISCONTINUED | OUTPATIENT
Start: 2022-04-19 | End: 2022-04-19

## 2022-04-19 RX ADMIN — ONDANSETRON 4 MG: 2 INJECTION INTRAMUSCULAR; INTRAVENOUS at 11:24

## 2022-04-19 RX ADMIN — TOPICAL ANESTHETIC 1 SPRAY: 200 SPRAY DENTAL; PERIODONTAL at 10:45

## 2022-04-19 RX ADMIN — LEVOFLOXACIN 500 MG: 5 INJECTION, SOLUTION INTRAVENOUS at 10:45

## 2022-04-19 RX ADMIN — FENTANYL CITRATE 25 MCG: 50 INJECTION, SOLUTION INTRAMUSCULAR; INTRAVENOUS at 11:01

## 2022-04-19 RX ADMIN — LIDOCAINE HYDROCHLORIDE 40 MG: 20 INJECTION, SOLUTION INFILTRATION; PERINEURAL at 10:46

## 2022-04-19 RX ADMIN — PROPOFOL 30 MG: 10 INJECTION, EMULSION INTRAVENOUS at 10:51

## 2022-04-19 RX ADMIN — SODIUM CHLORIDE, POTASSIUM CHLORIDE, SODIUM LACTATE AND CALCIUM CHLORIDE: 600; 310; 30; 20 INJECTION, SOLUTION INTRAVENOUS at 10:45

## 2022-04-19 RX ADMIN — FENTANYL CITRATE 25 MCG: 50 INJECTION, SOLUTION INTRAMUSCULAR; INTRAVENOUS at 11:10

## 2022-04-19 RX ADMIN — PROPOFOL 40 MG: 10 INJECTION, EMULSION INTRAVENOUS at 10:45

## 2022-04-19 NOTE — BRIEF OP NOTE
[facility] Brief Operative Note    Pre-operative diagnosis: Pancreatic cyst [K86.2]   Post-operative diagnosis * No post-op diagnosis entered *   Procedure: Procedure(s):  ESOPHAGOGASTRODUODENOSCOPY, WITH FINE NEEDLE ASPIRATION BIOPSY, WITH ENDOSCOPIC ULTRASOUND GUIDANCE   Surgeon: Guru Robi MD       Estimated blood loss: None    Specimens: None   Findings:      EUS    3 cm lesion in the peripancreatic area. EUS FNB x5 was performed    Recommendations    Await cytology

## 2022-04-19 NOTE — ANESTHESIA CARE TRANSFER NOTE
Patient: Junior Rodriguez    Procedure: Procedure(s):  ESOPHAGOGASTRODUODENOSCOPY, WITH FINE NEEDLE ASPIRATION BIOPSY, WITH ENDOSCOPIC ULTRASOUND GUIDANCE       Diagnosis: Pancreatic cyst [K86.2]  Diagnosis Additional Information: No value filed.    Anesthesia Type:   MAC     Note:    Oropharynx: oropharynx clear of all foreign objects and spontaneously breathing  Level of Consciousness: awake  Oxygen Supplementation: room air    Independent Airway: airway patency satisfactory and stable  Dentition: dentition unchanged  Vital Signs Stable: post-procedure vital signs reviewed and stable  Report to RN Given: handoff report given  Patient transferred to: Phase II    Handoff Report: Identifed the Patient, Identified the Reponsible Provider, Reviewed the pertinent medical history, Discussed the surgical course, Reviewed Intra-OP anesthesia mangement and issues during anesthesia, Set expectations for post-procedure period and Allowed opportunity for questions and acknowledgement of understanding      Vitals:  Vitals Value Taken Time   /96 04/19/22 1132   Temp     Pulse 70 04/19/22 1132   Resp     SpO2 100 % 04/19/22 1136   Vitals shown include unvalidated device data.    Electronically Signed By: POLLO Powell CRNA  April 19, 2022  11:38 AM

## 2022-04-19 NOTE — DISCHARGE INSTRUCTIONS
[facility]   Adult Discharge Orders & Instructions     For 24 hours after surgery    Get plenty of rest.  A responsible adult must stay with you for at least 24 hours after you leave the hospital.   Do not drive or use heavy equipment.  If you have weakness or tingling, don't drive or use heavy equipment until this feeling goes away.  Do not drink alcohol.  Avoid strenuous or risky activities.  Ask for help when climbing stairs.   You may feel lightheaded.  IF so, sit for a few minutes before standing.  Have someone help you get up.   If you have nausea (feel sick to your stomach): Drink only clear liquids such as apple juice, ginger ale, broth or 7-Up.  Rest may also help.  Be sure to drink enough fluids.  Move to a regular diet as you feel able.  You may have a slight fever. Call the doctor if your fever is over 100 F (37.7 C) (taken under the tongue) or lasts longer than 24 hours.  You may have a dry mouth, a sore throat, muscle aches or trouble sleeping.  These should go away after 24 hours.  Do not make important or legal decisions.   Call your doctor for any of the followin.  Signs of infection (fever, growing tenderness at the surgery site, a large amount of drainage or bleeding, severe pain, foul-smelling drainage, redness, swelling).    2. It has been over 8 to 10 hours since surgery and you are still not able to urinate (pass water).    3.  Headache for over 24 hours.    4.  Numbness, tingling or weakness the day after surgery (if you had spinal anesthesia).

## 2022-04-20 LAB
PATH REPORT.COMMENTS IMP SPEC: NORMAL
PATH REPORT.FINAL DX SPEC: NORMAL
PATH REPORT.GROSS SPEC: NORMAL
PATH REPORT.MICROSCOPIC SPEC OTHER STN: NORMAL
PATH REPORT.RELEVANT HX SPEC: NORMAL

## 2022-04-20 NOTE — RESULT ENCOUNTER NOTE
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 There is no risk of malignant transformation and these cysts do not warrant further follow up. Findings discussed with patient over the phone. No further MRI surveillance is indicated

## 2022-04-21 LAB — UPPER EUS: NORMAL

## 2022-04-22 ENCOUNTER — HOSPITAL ENCOUNTER (OUTPATIENT)
Dept: MRI IMAGING | Facility: CLINIC | Age: 42
Discharge: HOME | End: 2022-04-22
Attending: FAMILY MEDICINE | Admitting: FAMILY MEDICINE
Payer: COMMERCIAL

## 2022-04-22 ENCOUNTER — TELEPHONE (OUTPATIENT)
Dept: ORTHOPEDICS | Facility: CLINIC | Age: 42
End: 2022-04-22
Payer: COMMERCIAL

## 2022-04-22 DIAGNOSIS — R10.31 RIGHT INGUINAL PAIN: ICD-10-CM

## 2022-04-22 DIAGNOSIS — R10.2 PELVIC PAIN: ICD-10-CM

## 2022-04-22 DIAGNOSIS — R10.2 PELVIC PAIN: Primary | ICD-10-CM

## 2022-04-22 PROCEDURE — 72195 MRI PELVIS W/O DYE: CPT | Mod: 26 | Performed by: RADIOLOGY

## 2022-04-22 PROCEDURE — 72195 MRI PELVIS W/O DYE: CPT

## 2022-04-22 RX ORDER — CELECOXIB 100 MG/1
100 CAPSULE ORAL 2 TIMES DAILY
Qty: 20 CAPSULE | Refills: 0 | Status: SHIPPED | OUTPATIENT
Start: 2022-04-22 | End: 2022-04-29

## 2022-04-22 NOTE — CONFIDENTIAL NOTE
Patient having significant pain over the pubic symphysis.  MRI was completed today with read pending.  Given pain not relieved with Relafen or ibuprofen we will switch to Celebrex and follow-up next week when MRI read is completed.

## 2022-04-23 ASSESSMENT — LIFESTYLE VARIABLES: TOBACCO_USE: 1

## 2022-04-24 NOTE — ANESTHESIA PREPROCEDURE EVALUATION
Anesthesia Pre-Procedure Evaluation    Patient: Junior Rodriguez   MRN: [id number] : 1980        Procedure : Procedure(s):  ESOPHAGOGASTRODUODENOSCOPY, WITH FINE NEEDLE ASPIRATION BIOPSY, WITH ENDOSCOPIC ULTRASOUND GUIDANCE          Past Medical History:   Diagnosis Date     Epididymitis      Multiple joint pain      Pancreatic cyst      Tobacco use       Past Surgical History:   Procedure Laterality Date     DENERVATION OF SPERMATIC CORD MICROSURGICAL Left 2015    Procedure: DENERVATION OF SPERMATIC CORD MICROSURGICAL;  Surgeon: Kiko Morgan MD;  Location: [facility] OR     EPIDIDYMECTOMY  2014     ESOPHAGOSCOPY, GASTROSCOPY, DUODENOSCOPY (EGD), COMBINED N/A 2022    Procedure: ESOPHAGOGASTRODUODENOSCOPY, WITH FINE NEEDLE ASPIRATION BIOPSY, WITH ENDOSCOPIC ULTRASOUND GUIDANCE;  Surgeon: Guru Kirt Rosenbaum MD;  Location: [facility] GI     VASECTOMY  2013      No Known Allergies   Social History     Tobacco Use     Smoking status: Current Every Day Smoker     Packs/day: 1.00     Years: 30.00     Pack years: 30.00     Types: Cigarettes     Smokeless tobacco: Never Used     Tobacco comment: Plans to quit in future   Substance Use Topics     Alcohol use: No      Wt Readings from Last 1 Encounters:   22 97.5 kg (214 lb 15.2 oz)        Anesthesia Evaluation   Pt has had prior anesthetic.         ROS/MED HX  ENT/Pulmonary:     (+) tobacco use,     Neurologic:  - neg neurologic ROS     Cardiovascular:  - neg cardiovascular ROS     METS/Exercise Tolerance: >4 METS    Hematologic:  - neg hematologic  ROS     Musculoskeletal:  - neg musculoskeletal ROS     GI/Hepatic:  - neg GI/hepatic ROS     Renal/Genitourinary:  - neg Renal ROS     Endo:  - neg endo ROS     Psychiatric/Substance Use:  - neg psychiatric ROS     Infectious Disease:  - neg infectious disease ROS     Malignancy:  - neg malignancy ROS     Other:  - neg other ROS          Physical Exam    Airway  airway exam normal            Respiratory Devices and Support         Dental  no notable dental history         Cardiovascular   cardiovascular exam normal          Pulmonary   pulmonary exam normal                OUTSIDE LABS:  CBC:   Lab Results   Component Value Date    WBC 9.8 03/10/2022    WBC 7.1 05/29/2015    HGB 13.3 03/10/2022    HGB 13.5 05/29/2015    HCT 38.8 (L) 03/10/2022    HCT 39.3 (L) 05/29/2015     03/10/2022     05/29/2015     BMP:   Lab Results   Component Value Date     05/29/2015    POTASSIUM 3.8 05/29/2015    CHLORIDE 106 05/29/2015    CO2 25 05/29/2015    BUN 13 05/29/2015    CR 0.87 05/29/2015    GLC 99 02/15/2022     (H) 05/29/2015     COAGS: No results found for: PTT, INR, FIBR  POC: No results found for: BGM, HCG, HCGS  HEPATIC:   Lab Results   Component Value Date    ALBUMIN 3.9 03/24/2022    PROTTOTAL 7.6 03/24/2022    ALT 25 03/24/2022    AST 19 03/24/2022    ALKPHOS 59 03/24/2022    BILITOTAL 0.5 03/24/2022     OTHER:   Lab Results   Component Value Date    CRISTOPHER 8.6 05/29/2015    LIPASE 424 (H) 03/24/2022    CRP <2.9 02/15/2022    SED 8 02/15/2022       Anesthesia Plan    ASA Status:  2   NPO Status:  NPO Appropriate    Anesthesia Type: MAC.     - Reason for MAC: straight local not clinically adequate   Induction: N/a.   Maintenance: TIVA.        Consents    Anesthesia Plan(s) and associated risks, benefits, and realistic alternatives discussed. Questions answered and patient/representative(s) expressed understanding.    - Discussed:     - Discussed with:  Patient         Postoperative Care       PONV prophylaxis: Ondansetron (or other 5HT-3)     Comments:                Praveen Diaz MD

## 2022-04-24 NOTE — ANESTHESIA POSTPROCEDURE EVALUATION
Patient: Junior Rodriguez    Procedure: Procedure(s):  ESOPHAGOGASTRODUODENOSCOPY, WITH FINE NEEDLE ASPIRATION BIOPSY, WITH ENDOSCOPIC ULTRASOUND GUIDANCE       Anesthesia Type:  MAC    Note:  Disposition: Outpatient   Postop Pain Control: Uneventful            Sign Out: Well controlled pain   PONV: No   Neuro/Psych: Uneventful            Sign Out: Acceptable/Baseline neuro status   Airway/Respiratory: Uneventful            Sign Out: Acceptable/Baseline resp. status   CV/Hemodynamics: Uneventful            Sign Out: Acceptable CV status; No obvious hypovolemia; No obvious fluid overload   Other NRE: NONE   DID A NON-ROUTINE EVENT OCCUR? No           Last vitals:  Vitals Value Taken Time   /84 04/19/22 1150   Temp 36.1  C (97  F) 04/19/22 1150   Pulse 68 04/19/22 1150   Resp 16 04/19/22 1150   SpO2 100 % 04/19/22 1150       Electronically Signed By: Praveen Diaz MD  April 23, 2022  8:47 PM   Sai Chun is a 72 y.o. male who was seen by synchronous (real-time) audio-video technology on 10/8/2021 for Form Completion        Assessment & Plan:   Diagnoses and all orders for this visit:    1. Dyspnea on exertion  -     REFERRAL TO PULMONARY DISEASE    2. Uncontrolled type 2 diabetes mellitus with hyperglycemia (HCC)    3. Obesity, morbid ([address]    Referral to pulmonologist for evaluation of pulmonary status        Subjective: The patient presents for an Audio-visual teleconference appointment  to discuss completing paperwork from his job. Patient submitted a form to the office requesting that he receive permission not to wear a mask at work. Per the patient he is unable to wear a mask over his face. Patient has no previous pulmonary diagnoses. His BMI is 40.44 kg per metric square. He reports that he was diagnosed with bronchitis x1 event. He is negative for wheezing or dyspnea today. Diabetes mellitus-Per the patient he has been unable to pay for his insulin secondary to not working. Patient has not picked up his Humalog or Mattix prescription. Patient is scheduled for follow-up appointment next week as his previous hemoglobin A1c was elevated. Patient given contact number for [employer] for assistance with his medication. Prior to Admission medications    Medication Sig Start Date End Date Taking? Authorizing Provider   insulin glargine (LANTUS,BASAGLAR) 100 unit/mL (3 mL) inpn Inject 40 units Subcutaneously every night 9/1/21  Yes Schuyler Cushing, NP   SITagliptin (JANUVIA) 25 mg tablet Take 1 Tablet by mouth daily. 9/1/21  Yes Schuyler Cushing, NP   insulin lispro (HUMALOG) 100 unit/mL kwikpen Inject 12 units subcutaneously at  breakfast, lunch, and dinner 8/3/21  Yes Schuyler Cushing, NP   gabapentin (NEURONTIN) 300 mg capsule Take 300 mg by mouth three (3) times daily. Yes Other, MD Joel   fish oil-omega-3 fatty acids (Fish Oil) 300-500 mg cap Take  by mouth.    Yes Other, MD Joel   Insulin Syringe-Needle U-100 (Insulin Syringe) 0.5 mL 29 gauge x 1/2\" syrg To administer insulin 3 times daily AC, diabetes mellitus type 2 4/2/21  Yes Alverto Montero MD   Blood-Glucose Meter monitoring kit Check blood sugar 3 times daily AC for type 2 diabetes mellitus 4/2/21  Yes Alverto Montero MD   glucose blood VI test strips (Glucocom Glucose) strip Provide glucometer compatible strips to check blood sugar 3 times daily AC for type 2 diabetes mellitus 4/2/21  Yes Alverto Montero MD   lancets misc For fingersticks 3 times daily [facility] 4/2/21  Yes Alverto Montero MD   amLODIPine (NORVASC) 10 mg tablet Take  by mouth daily. Yes Provider, Historical   atorvastatin (LIPITOR) 40 mg tablet Take  by mouth daily. Yes Provider, Historical   indapamide (LOZOL) 2.5 mg tablet Take  by mouth daily. Yes Provider, Historical     Patient Active Problem List   Diagnosis Code    Obesity, morbid ([facility] E66.01    Uncontrolled diabetes mellitus ([facility] E11.65    JOSE (acute kidney injury) ([facility] N17.9     Patient Active Problem List    Diagnosis Date Noted    Uncontrolled diabetes mellitus ([facility] 03/31/2021    JOSE (acute kidney injury) ([facility] 03/31/2021    Obesity, morbid ([facility] 11/01/2018     Current Outpatient Medications   Medication Sig Dispense Refill    insulin glargine (LANTUS,BASAGLAR) 100 unit/mL (3 mL) inpn Inject 40 units Subcutaneously every night 5 Adjustable Dose Pre-filled Pen Syringe 2    SITagliptin (JANUVIA) 25 mg tablet Take 1 Tablet by mouth daily. 30 Tablet 1    insulin lispro (HUMALOG) 100 unit/mL kwikpen Inject 12 units subcutaneously at  breakfast, lunch, and dinner 5 Adjustable Dose Pre-filled Pen Syringe 3    gabapentin (NEURONTIN) 300 mg capsule Take 300 mg by mouth three (3) times daily.  fish oil-omega-3 fatty acids (Fish Oil) 300-500 mg cap Take  by mouth.       Insulin Syringe-Needle U-100 (Insulin Syringe) 0.5 mL 29 gauge x 1/2\" syrg To administer insulin 3 times daily AC, diabetes mellitus type 2 100 Syringe 0    Blood-Glucose Meter monitoring kit Check blood sugar 3 times daily AC for type 2 diabetes mellitus 1 Kit 0    glucose blood VI test strips (Glucocom Glucose) strip Provide glucometer compatible strips to check blood sugar 3 times daily AC for type 2 diabetes mellitus 100 Strip 0    lancets misc For fingersticks 3 times daily  Each 0    amLODIPine (NORVASC) 10 mg tablet Take  by mouth daily.  atorvastatin (LIPITOR) 40 mg tablet Take  by mouth daily.  indapamide (LOZOL) 2.5 mg tablet Take  by mouth daily. Allergies   Allergen Reactions    Aspartame Other (comments)     jittery    Aspirin Other (comments)     Abdominal pain    Aspirin Other (comments)     GI upset     Past Surgical History:   Procedure Laterality Date    HX LAP CHOLECYSTECTOMY      Just took gall stones out    HX ORTHOPAEDIC Right 1973    Torn cartilage,knee       ROS    ROS:  History obtained from the patient intake forms which are reviewed with the patient  · General: negative for - chills, fever, weight changes or malaise  · HEENT: no sore throat, nasal congestion, vision problems or ear problems  · Respiratory: Patient reports he is unable to wear a mask at work  · Cardiovascular: no chest pain, palpitations, or dyspnea on exertion  · Gastrointestinal: no abdominal pain, N/V, change in bowel habits, or black or bloody stools  · Musculoskeletal: no back pain, joint pain, joint stiffness, muscle pain or muscle weakness  · Neurological: no numbness, tingling, headache or dizziness  · Endo:  No polyuria or polydipsia. · : no hematuria, dysuria, frequency, hesitancy, or nocturia. · Psychological: negative for - anxiety, depression, sleep disturbances, suicidal or homicidal ideations      Objective:   No flowsheet data found.      [INSTRUCTIONS:  \"[x]\" Indicates a positive item  \"[]\" Indicates a negative item  -- DELETE ALL ITEMS NOT EXAMINED]    Constitutional: [x] Appears well-developed and well-nourished [x] No apparent distress      [] Abnormal -     Mental status: [x] Alert and awake  [x] Oriented to person/place/time [x] Able to follow commands    [] Abnormal -     Eyes:   EOM    [x]  Normal    [] Abnormal -   Sclera  [x]  Normal    [] Abnormal -          Discharge [x]  None visible   [] Abnormal -     HENT: [x] Normocephalic, atraumatic  [] Abnormal -   [x] Mouth/Throat: Mucous membranes are moist    External Ears [x] Normal  [] Abnormal -    Neck: [x] No visualized mass [] Abnormal -     Pulmonary/Chest: [x] Respiratory effort normal   [x] No visualized signs of difficulty breathing or respiratory distress        [] Abnormal -      Musculoskeletal:   [x] Normal gait with no signs of ataxia         [x] Normal range of motion of neck        [] Abnormal -     Neurological:        [x] No Facial Asymmetry (Cranial nerve 7 motor function) (limited exam due to video visit)          [x] No gaze palsy        [] Abnormal -          Skin:        [x] No significant exanthematous lesions or discoloration noted on facial skin         [] Abnormal -            Psychiatric:       [x] Normal Affect [] Abnormal -        [x] No Hallucinations    Other pertinent observable physical exam findings:-        We discussed the expected course, resolution and complications of the diagnosis(es) in detail. Medication risks, benefits, costs, interactions, and alternatives were discussed as indicated. I advised him to contact the office if his condition worsens, changes or fails to improve as anticipated. He expressed understanding with the diagnosis(es) and plan. Francis Almanza, was evaluated through a synchronous (real-time) audio-video encounter. The patient (or guardian if applicable) is aware that this is a billable service. Verbal consent to proceed has been obtained within the past 12 months.  The visit was conducted pursuant to the emergency declaration under the [facility], [address] [place] [address] waiver authority and the [employer] and [place] [employer] General Act. Patient identification was verified, and a caregiver was present when appropriate. The patient was located in a state where the provider was credentialed to provide care.       Marilynn Cunningham NP

## 2022-04-28 NOTE — PROGRESS NOTES
ASSESSMENT & PLAN  Junior was seen today for pain.    Diagnoses and all orders for this visit:    Strain of muscle of right groin region  -     meloxicam (MOBIC) 15 MG tablet; Take 1 tablet (15 mg) by mouth daily  -     omeprazole 20 MG tablet; Take 1 tablet (20 mg) by mouth daily  -     Physical Therapy Referral; Future    Pelvic pain  -     meloxicam (MOBIC) 15 MG tablet; Take 1 tablet (15 mg) by mouth daily  -     omeprazole 20 MG tablet; Take 1 tablet (20 mg) by mouth daily  -     Physical Therapy Referral; Future    Hip pain, right    Multiple joint pain  -     Discontinue: ibuprofen (ADVIL/MOTRIN) 800 MG tablet; Take 1 tablet (800 mg) by mouth every 8 hours as needed for moderate pain  -     Discontinue: ibuprofen (ADVIL/MOTRIN) 800 MG tablet; Take 1 tablet (800 mg) by mouth every 8 hours as needed for moderate pain      # Right Groin Strain: Symptoms ongoing over the past year and a half with pain over the groin right greater than left.  He has had extensive work-up thus far including a inguinal ultrasound, hip x-ray and CT they are all negative.  Today he is still having tenderness to palpation over the pubic symphysis as well as the origins of the adductor muscles on both sides.  MRI today showing strain of the right groin muscle likely the cause of his pain.  My concern is he may have irritation of the pubic symphysis is causing his chronic pain.  Counseled patient on treatment options including home exercise, physical therapy, steroid injections, anti-inflammatory medications.  Given this plan to treat as below and follow-up if not improving.    Image Findings: reviewed MRI results showing strain of the right groin muscle  Treatment: Activities as tolerated, physical therapy ordered today  Job: no restrictions  Medications/Injections: Stop taking Relafen, Celebrex, try Mobic, medication for acid blocker ordered today as well, can take Flexeril at night, no steroid injection currently  Follow-up: 2 to 4 weeks  if symptoms are improving, can consider steroid injection    -----    SUBJECTIVE:  Junior Rodriguez is a 41 year old male who is seen in follow-up for groin pain. They were last seen 4/22/2022 and notes that he continues to have right medial groin pain. The patient is seen by themselves.    They indicate that their current pain level is 3/10. They have tried Celebrex, Relafen and Flexeril. Has been stretching. Pain with walking, crouching down and prolonged standing.      Patient's past medical, surgical, social, and family histories were reviewed today and no changes are noted.    REVIEW OF SYSTEMS:  Constitutional: NEGATIVE for fever, chills, change in weight  Skin: NEGATIVE for worrisome rashes, moles or lesions  GI/: NEGATIVE for bowel or bladder changes  Neuro: NEGATIVE for weakness, dizziness or paresthesias    OBJECTIVE:  /84   Wt 97.5 kg (214 lb 15.2 oz)   BMI 29.98 kg/m     General: healthy, alert and in no distress  HEENT: no scleral icterus or conjunctival erythema  Skin: no suspicious lesions or rash. No jaundice.  CV: regular rhythm by palpation, no pedal edema  Resp: normal respiratory effort without conversational dyspnea   Psych: normal mood and affect  Gait: normal steady gait with appropriate coordination and balance  Neuro: normal light touch sensory exam of the extremities.    MSK:    RIGHT HIP  Inspection:    No swelling, bruising, discoloration, or obvious deformity or asymmetry  Palpation:    Tender about the pubic symphysis and adductor group origin right >left. Otherwise all other landmarks are nontender.    Crepitus is Absent  Active Range of Motion:     Flexion full, extension full / IR full / ER  full  Strength:    Flexion 5/5 / extension 5/5 / adduction 5/5 / abduction 5/5  Special Tests:    Positive: None    Negative: Slick, ESTER, anterior impingement (FADIR)      Independent visualization of the below image:    Recent Results (from the past 744 hour(s))   US Hernia Evaluation     Narrative    EXAM: US HERNIA EVALUATION  LOCATION: [facility]  DATE/TIME: 3/30/2022 5:51 PM    INDICATION: Right inguinal bulge with associated tenderness.  COMPARISON: None.    TECHNIQUE: Transabdominal ultrasound of the groin during rest and Valsalva.      Impression    IMPRESSION:  1.  Focused ultrasound scanning in the right inguinal region demonstrates no evidence of hernia.   XR Hand Bilateral G/E 3 Views    Narrative    XR HAND BILATERAL G/E 3 VIEWS 4/12/2022 10:36 AM     HISTORY: Multiple joint pain    COMPARISON: None.      Impression    IMPRESSION: Normal.    BRII VELAZCO MD         SYSTEM ID:  [id number]   MR Pelvis Bone wo Contrast    Narrative    MR pelvis without contrast 4/22/2022 9:40 AM    Techniques: Multiplanar multisequence imaging of the pelvis was  obtained without  administration of  intravenous contrast using  routing MS protocol.    History: Concern for pubic symphysis injury or adductor tendon tear  symptoms over 1 to 2 years worsening; Right inguinal pain; Pelvic pain      Comparison: Right greater than left groin/pubic symphysis obtained  over several years, worsening in the last several months.    Findings:    Osseous structures  Osseous structures: No fracture, stress reaction, avascular necrosis,  or focal osseous lesion is seen.    Internal derangement of joints are not well assessed owing to chosen  field of view.    Joint and Periarticular soft tissue:    Sacroiliac joints and pubic symphysis are congruent.    Joint effusion: A physiologic amount of joint fluid in bilateral hip.    Bursal effusion: Minimal nonspecific edema over the greater  trochanter. No substantial iliopsoas or trochanteric bursal effusion.    Muscles and tendons  Muscles and tendons: Rectus femoris, sartorius, and iliopsoas tendons  intact bilaterally. Insertional gluteus minimus tendinopathy with  possible undersurface tearing, greater on the left, axial image 40.  The  visualized adductor muscles are unremarkable bilaterally. Mild  bilateral hamstring insertional tendinopathy with possible low-grade  undersurface tearing, greater on the left, axial image 44. Thickening  with increased intrasubstance T2 signal of the right adductor longus  tendon at the pubis symphysis attachment, axial image 44. Abdominis  rectus attachments appear intact.    Nerves:  The visualized course of the sciatic nerves are unremarkable  bilaterally.    Other Findings:  Small fat-containing left inguinal hernia.      Impression    Impression:  1. Thickening and increased intrasubstance T2 signal of the right  adductor longus tendon at the pubis symphysis attachment, low-grade  tearing, axial image 44, sagittal image 45.  2.  Mild insertional gluteus minimus tendinopathy with possible  undersurface tearing, greater on the left, axial image 40.  3. Mild bilateral hamstring insertional tendinopathy with possible  low-grade undersurface tearing, greater on the left, axial image 44.     I have personally reviewed the examination and initial interpretation  and I agree with the findings.    JOÃO RIVAS MD         SYSTEM ID:  [id number]            Ty Navarro MD, [facility], [facility]

## 2022-04-29 ENCOUNTER — OFFICE VISIT (OUTPATIENT)
Dept: ORTHOPEDICS | Facility: CLINIC | Age: 42
End: 2022-04-29
Payer: COMMERCIAL

## 2022-04-29 VITALS
WEIGHT: 214.95 LBS | DIASTOLIC BLOOD PRESSURE: 84 MMHG | BODY MASS INDEX: 29.98 KG/M2 | SYSTOLIC BLOOD PRESSURE: 124 MMHG

## 2022-04-29 DIAGNOSIS — M25.551 HIP PAIN, RIGHT: ICD-10-CM

## 2022-04-29 DIAGNOSIS — M25.50 MULTIPLE JOINT PAIN: ICD-10-CM

## 2022-04-29 DIAGNOSIS — R10.2 PELVIC PAIN: ICD-10-CM

## 2022-04-29 DIAGNOSIS — S39.011A STRAIN OF MUSCLE OF RIGHT GROIN REGION: Primary | ICD-10-CM

## 2022-04-29 PROCEDURE — 99214 OFFICE O/P EST MOD 30 MIN: CPT | Performed by: FAMILY MEDICINE

## 2022-04-29 RX ORDER — NICOTINE POLACRILEX 4 MG/1
20 GUM, CHEWING ORAL DAILY
Qty: 20 TABLET | Refills: 0 | Status: SHIPPED | OUTPATIENT
Start: 2022-04-29 | End: 2022-11-28

## 2022-04-29 RX ORDER — IBUPROFEN 800 MG/1
800 TABLET, FILM COATED ORAL EVERY 8 HOURS PRN
Qty: 180 TABLET | Refills: 3 | Status: SHIPPED | OUTPATIENT
Start: 2022-04-29 | End: 2022-04-29

## 2022-04-29 RX ORDER — MELOXICAM 15 MG/1
15 TABLET ORAL DAILY
Qty: 20 TABLET | Refills: 0 | Status: SHIPPED | OUTPATIENT
Start: 2022-04-29 | End: 2022-11-28

## 2022-04-29 RX ORDER — IBUPROFEN 800 MG/1
800 TABLET, FILM COATED ORAL EVERY 8 HOURS PRN
Qty: 30 TABLET | Refills: 0 | Status: SHIPPED | OUTPATIENT
Start: 2022-04-29 | End: 2022-04-29

## 2022-04-29 ASSESSMENT — PAIN SCALES - GENERAL: PAINLEVEL: MILD PAIN (3)

## 2022-04-29 NOTE — PATIENT INSTRUCTIONS
# Right Groin Strain: Symptoms ongoing over the past year and a half with pain over the groin right greater than left.  He has had extensive work-up thus far including a inguinal ultrasound, hip x-ray and CT they are all negative.  Today he is still having tenderness to palpation over the pubic symphysis as well as the origins of the adductor muscles on both sides.  MRI today showing strain of the right groin muscle likely the cause of his pain.  My concern is he may have irritation of the pubic symphysis is causing his chronic pain.  Counseled patient on treatment options including home exercise, physical therapy, steroid injections, anti-inflammatory medications.  Given this plan to treat as below and follow-up if not improving.    Image Findings: reviewed MRI results showing strain of the right groin muscle  Treatment: Activities as tolerated, physical therapy ordered today  Job: no restrictions  Medications/Injections: Stop taking Relafen, Celebrex, try Mobic, medication for acid blocker ordered today as well, can take Flexeril at night, no steroid injection currently  Follow-up: 2 to 4 weeks if symptoms are improving, can consider steroid injection    It was great seeing you again today!    Ty Navarro

## 2022-04-29 NOTE — LETTER
4/29/2022         RE: Junior Rodriguez  [id number] [address]  [facility] [address]        Dear Colleague,    Thank you for referring your patient, Junior Rodriguez, to the [facility] [place]. Please see a copy of my visit note below.    ASSESSMENT & PLAN  Junior was seen today for pain.    Diagnoses and all orders for this visit:    Strain of muscle of right groin region  -     meloxicam (MOBIC) 15 MG tablet; Take 1 tablet (15 mg) by mouth daily  -     omeprazole 20 MG tablet; Take 1 tablet (20 mg) by mouth daily  -     Physical Therapy Referral; Future    Pelvic pain  -     meloxicam (MOBIC) 15 MG tablet; Take 1 tablet (15 mg) by mouth daily  -     omeprazole 20 MG tablet; Take 1 tablet (20 mg) by mouth daily  -     Physical Therapy Referral; Future    Hip pain, right    Multiple joint pain  -     Discontinue: ibuprofen (ADVIL/MOTRIN) 800 MG tablet; Take 1 tablet (800 mg) by mouth every 8 hours as needed for moderate pain  -     Discontinue: ibuprofen (ADVIL/MOTRIN) 800 MG tablet; Take 1 tablet (800 mg) by mouth every 8 hours as needed for moderate pain      # Right Groin Strain: Symptoms ongoing over the past year and a half with pain over the groin right greater than left.  He has had extensive work-up thus far including a inguinal ultrasound, hip x-ray and CT they are all negative.  Today he is still having tenderness to palpation over the pubic symphysis as well as the origins of the adductor muscles on both sides.  MRI today showing strain of the right groin muscle likely the cause of his pain.  My concern is he may have irritation of the pubic symphysis is causing his chronic pain.  Counseled patient on treatment options including home exercise, physical therapy, steroid injections, anti-inflammatory medications.  Given this plan to treat as below and follow-up if not improving.    Image Findings: reviewed MRI results showing strain of the right groin muscle  Treatment: Activities  as tolerated, physical therapy ordered today  Job: no restrictions  Medications/Injections: Stop taking Relafen, Celebrex, try Mobic, medication for acid blocker ordered today as well, can take Flexeril at night, no steroid injection currently  Follow-up: 2 to 4 weeks if symptoms are improving, can consider steroid injection    -----    SUBJECTIVE:  Junior Rodriguez is a 41 year old male who is seen in follow-up for groin pain. They were last seen 4/22/2022 and notes that he continues to have right medial groin pain. The patient is seen by themselves.    They indicate that their current pain level is 3/10. They have tried Celebrex, Relafen and Flexeril. Has been stretching. Pain with walking, crouching down and prolonged standing.      Patient's past medical, surgical, social, and family histories were reviewed today and no changes are noted.    REVIEW OF SYSTEMS:  Constitutional: NEGATIVE for fever, chills, change in weight  Skin: NEGATIVE for worrisome rashes, moles or lesions  GI/: NEGATIVE for bowel or bladder changes  Neuro: NEGATIVE for weakness, dizziness or paresthesias    OBJECTIVE:  /84   Wt 97.5 kg (214 lb 15.2 oz)   BMI 29.98 kg/m     General: healthy, alert and in no distress  HEENT: no scleral icterus or conjunctival erythema  Skin: no suspicious lesions or rash. No jaundice.  CV: regular rhythm by palpation, no pedal edema  Resp: normal respiratory effort without conversational dyspnea   Psych: normal mood and affect  Gait: normal steady gait with appropriate coordination and balance  Neuro: normal light touch sensory exam of the extremities.    MSK:    RIGHT HIP  Inspection:    No swelling, bruising, discoloration, or obvious deformity or asymmetry  Palpation:    Tender about the pubic symphysis and adductor group origin right >left. Otherwise all other landmarks are nontender.    Crepitus is Absent  Active Range of Motion:     Flexion full, extension full / IR full / ER  full  Strength:     Flexion 5/5 / extension 5/5 / adduction 5/5 / abduction 5/5  Special Tests:    Positive: None    Negative: Logroll, ESTER, anterior impingement (FADIR)      Independent visualization of the below image:    Recent Results (from the past 744 hour(s))   US Hernia Evaluation    Narrative    EXAM: US HERNIA EVALUATION  LOCATION: [facility]  DATE/TIME: 3/30/2022 5:51 PM    INDICATION: Right inguinal bulge with associated tenderness.  COMPARISON: None.    TECHNIQUE: Transabdominal ultrasound of the groin during rest and Valsalva.      Impression    IMPRESSION:  1.  Focused ultrasound scanning in the right inguinal region demonstrates no evidence of hernia.   XR Hand Bilateral G/E 3 Views    Narrative    XR HAND BILATERAL G/E 3 VIEWS 4/12/2022 10:36 AM     HISTORY: Multiple joint pain    COMPARISON: None.      Impression    IMPRESSION: Normal.    BRII VELAZCO MD         SYSTEM ID:  [id number]   MR Pelvis Bone wo Contrast    Narrative    MR pelvis without contrast 4/22/2022 9:40 AM    Techniques: Multiplanar multisequence imaging of the pelvis was  obtained without  administration of  intravenous contrast using  routing [facility] [place] [place] protocol.    History: Concern for pubic symphysis injury or adductor tendon tear  symptoms over 1 to 2 years worsening; Right inguinal pain; Pelvic pain      Comparison: Right greater than left groin/pubic symphysis obtained  over several years, worsening in the last several months.    Findings:    Osseous structures  Osseous structures: No fracture, stress reaction, avascular necrosis,  or focal osseous lesion is seen.    Internal derangement of joints are not well assessed owing to chosen  field of view.    Joint and Periarticular soft tissue:    Sacroiliac joints and pubic symphysis are congruent.    Joint effusion: A physiologic amount of joint fluid in bilateral hip.    Bursal effusion: Minimal nonspecific edema over the greater  trochanter. No substantial iliopsoas  or trochanteric bursal effusion.    Muscles and tendons  Muscles and tendons: Rectus femoris, sartorius, and iliopsoas tendons  intact bilaterally. Insertional gluteus minimus tendinopathy with  possible undersurface tearing, greater on the left, axial image 40.  The visualized adductor muscles are unremarkable bilaterally. Mild  bilateral hamstring insertional tendinopathy with possible low-grade  undersurface tearing, greater on the left, axial image 44. Thickening  with increased intrasubstance T2 signal of the right adductor longus  tendon at the pubis symphysis attachment, axial image 44. Abdominis  rectus attachments appear intact.    Nerves:  The visualized course of the sciatic nerves are unremarkable  bilaterally.    Other Findings:  Small fat-containing left inguinal hernia.      Impression    Impression:  1. Thickening and increased intrasubstance T2 signal of the right  adductor longus tendon at the pubis symphysis attachment, low-grade  tearing, axial image 44, sagittal image 45.  2.  Mild insertional gluteus minimus tendinopathy with possible  undersurface tearing, greater on the left, axial image 40.  3. Mild bilateral hamstring insertional tendinopathy with possible  low-grade undersurface tearing, greater on the left, axial image 44.     I have personally reviewed the examination and initial interpretation  and I agree with the findings.    JOÃO RIVAS MD         SYSTEM ID:  [id number]            Ty Navarro MD, [facility]        Again, thank you for allowing me to participate in the care of your patient.        Sincerely,        Ty Navarro MD

## 2022-05-03 ENCOUNTER — HOSPITAL ENCOUNTER (OUTPATIENT)
Dept: PHYSICAL THERAPY | Facility: CLINIC | Age: 42
Setting detail: THERAPIES SERIES
Discharge: HOME | End: 2022-05-03
Attending: FAMILY MEDICINE
Payer: COMMERCIAL

## 2022-05-03 DIAGNOSIS — S39.011A STRAIN OF MUSCLE OF RIGHT GROIN REGION: ICD-10-CM

## 2022-05-03 DIAGNOSIS — R10.2 PELVIC PAIN: ICD-10-CM

## 2022-05-03 PROCEDURE — 97161 PT EVAL LOW COMPLEX 20 MIN: CPT | Mod: GP | Performed by: PHYSICAL THERAPIST

## 2022-05-03 PROCEDURE — 97110 THERAPEUTIC EXERCISES: CPT | Mod: GP | Performed by: PHYSICAL THERAPIST

## 2022-05-03 PROCEDURE — 97116 GAIT TRAINING THERAPY: CPT | Mod: GP | Performed by: PHYSICAL THERAPIST

## 2022-05-04 NOTE — PROGRESS NOTES
[facility]    OUTPATIENT PHYSICAL THERAPY ORTHOPEDIC EVALUATION  PLAN OF TREATMENT FOR OUTPATIENT REHABILITATION  (COMPLETE FOR INITIAL CLAIMS ONLY)  Patient's Last Name, First Name, M.I.  YOB: 1980  Junior Rodriguez    Provider s Name:  [facility]   Medical Record No.  [id number]   Start of Care Date:  05/03/22   Onset Date:  04/29/21 (over a year with Sx increasing; unknown date)   Type:     _X__PT   ___OT   ___SLP Medical Diagnosis:  Strain of muscle of right groin region; Pelvic pain     PT Diagnosis:  groin pain   Visits from SOC:  1      _________________________________________________________________________________  Plan of Treatment/Functional Goals:  balance training, gait training, joint mobilization, manual therapy, ROM, strengthening, stretching, neuromuscular re-education     Electrical stimulation, Cryotherapy, Hot packs, TENS, Traction, Ultrasound     Goals  Goal Identifier: ST HEP  Goal Description: Pt will be independent wtih his HEP in 2 weeks to be able to continue with strengthening at home to reduce discomfort wtih squatting at work.  Target Date: 05/17/22    Goal Identifier: LT strength goal  Goal Description: Pt will have 5/5 LE strength in 8 weeks to be jessica to bend down at work and hold the position.  Target Date: 06/28/22    Goal Identifier: ST ROM goal  Goal Description: Pt will have 30 degrees of IR in 90/90 to 4 weeks to improve his mobility for performing squats at work.  Target Date: 06/28/22                       Therapy Frequency:  1 time/week  Predicted Duration of Therapy Intervention:  8    Nichole Alicea PT                 I CERTIFY THE NEED FOR THESE SERVICES FURNISHED UNDER        THIS PLAN OF TREATMENT AND WHILE UNDER MY CARE     (Physician co-signature of this document indicates review and certification of the therapy plan).                        Certification Date From:  05/03/22   Certification Date To:  08/01/22    Referring Provider:  Dr. Navarro    Initial Assessment        See Epic Evaluation Start of Care Date: 05/03/22

## 2022-05-04 NOTE — PROGRESS NOTES
05/03/22 0800   General Information   Type of Visit Initial OP Ortho PT Evaluation   Start of Care Date 05/03/22   Referring Physician Dr. Navarro   Patient/Family Goals Statement decrease pain to be able to perform work tasks   Orders Evaluate and Treat   Date of Order 04/29/22   Certification Required? Yes   Medical Diagnosis Strain of muscle of right groin region; Pelvic pain   Body Part(s)   Body Part(s) Hip   Presentation and Etiology   Pertinent history of current problem (include personal factors and/or comorbidities that impact the POC) Increased pain when walking around [place] without known cause. He states there is an increase in discomfort to the area over time and dependent on the activity. at this time, ambulation is the greater aggravating factor. The pt notes no radiating Sx and all in the groin. R > L with R adductor tear noted on MRI.   Impairments A. Pain;D. Decreased ROM;E. Decreased flexibility;H. Impaired gait;J. Burning;L. Tingling;M. Locking or catching   Functional Limitations perform activities of daily living;perform required work activities   Symptom Location groin, R >L   How/Where did it occur With repetition/overuse   Onset date of current episode/exacerbation 04/29/21  (over a year with Sx increasing; unknown date)   Chronicity Chronic   Pain rating (0-10 point scale) Best (/10);Worst (/10)   Best (/10) 2   Worst (/10) 10   Pain quality A. Sharp;B. Dull;C. Aching;D. Burning;G. Cramping   Frequency of pain/symptoms A. Constant   Pain/symptoms are: Worse during the day   Pain/symptoms exacerbated by B. Walking;D. Carrying;G. Certain positions;H. Overhead reach;I. Bending;K. Home tasks;L. Work tasks   Pain/symptoms eased by A. Sitting;C. Rest;F. Certain positions;I. OTC medication(s)   Progression of symptoms since onset: Unchanged   Prior Level of Function   Functional Level Prior Comment pt was independent with all mobility. The pt was fully functioning without limitations nor pain  to the area. 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 Employed   Employment Comments concrete work- self- employed   Living environment House/townhome   Home/community accessibility 4 stairs to enter   Fall Risk Screen   Fall screen completed by PT   Have you fallen 2 or more times in the past year? No   Have you fallen and had an injury in the past year? No   Is patient a fall risk? No   Abuse Screen (yes response referral indicated)   Feels Unsafe at Home or Work/School no   Feels Threatened by Someone no   Does Anyone Try to Keep You From Having Contact with Others or Doing Things Outside Your Home? no   Physical Signs of Abuse Present no   Hip Objective Findings   Gait/Locomotion toeingout R > L, slight stiff trunk, wide STEFANIA with weight shift   Balance/Proprioception (Single Leg Stance) - trendelenburg   Side (if bilateral, select both right and left) Right;Left   Lumbar % of flex, ext, and SB without pain   Femoral Nerve Stretch Test -   Straight Leg Raise Test -   ESTER Test -   FADIR Test some discomfort but doable   Palpation tenderness to adductors   Observation pt has seen swelling to annamarie area but no   Posture toeing out R > L   Narciso Flexibility Test +   Obers/ITB Flexibility -   Right Hamstring Flexibility -   Left Hamstring Flexibility -   Right Piriformis Flexibility slight tightness   Left Piriformis Flexibility -   Right Hip Abduction PROM tightness and pulling on adductors in this position   Right Hip ER PROM 60 degrees in 90/90   Right Hip IR PROM 20 degrees in 90/90; discomfort to TFL wtih motion   Right Hip Flexion Strength 5/5   Left Hip Flexion Strength 5/5   Right Hip Abduction Strength 4/5   Left Hip Abduction Strength 4+/5   Right Hip Extension Strength 4+/5   Left Hip Extension Strength 4+/5   Right Hip IR Strength 4/5   Left Hip IR Strength 4/5   Right Hip ER Strength 5/5    Left Hip ER Strength 5/5/   Right Knee Flexion Strength 4+/5   Left Knee Flexion Strength 5/5   Right Knee Extension Strength 5/5   Left Knee Extension Strength 4+/5   Planned Therapy Interventions   Planned Therapy Interventions balance training;gait training;joint mobilization;manual therapy;ROM;strengthening;stretching;neuromuscular re-education   Planned Modality Interventions   Planned Modality Interventions Electrical stimulation;Cryotherapy;Hot packs;TENS;Traction;Ultrasound   Clinical Impression   Criteria for Skilled Therapeutic Interventions Met yes, treatment indicated   PT Diagnosis groin pain   Influenced by the following impairments weakness, pain, decreased ROM, abnormal gait   Functional limitations due to impairments interferring with work tasks, squatting, ambulation, functional mobility   Clinical Presentation Stable/Uncomplicated   Clinical Presentation Rationale current status; nothing noted at this time to affect POC   Clinical Decision Making (Complexity) Low complexity   Therapy Frequency 1 time/week   Predicted Duration of Therapy Intervention (days/wks) 8   Risk & Benefits of therapy have been explained Yes   Patient, Family & other staff in agreement with plan of care Yes   ORTHO GOALS   PT Ortho Eval Goals 1;2;3   Ortho Goal 1   Goal Identifier ST HEP   Goal Description Pt will be independent wtih his HEP in 2 weeks to be able to continue with strengthening at home to reduce discomfort wtih squatting at work.   Target Date 05/17/22   Ortho Goal 2   Goal Identifier LT strength goal   Goal Description Pt will have 5/5 LE strength in 8 weeks to be jessica to bend down at work and hold the position.   Target Date 06/28/22   Ortho Goal 3   Goal Identifier ST ROM goal   Goal Description Pt will have 30 degrees of IR in 90/90 to 4 weeks to improve his mobility for performing squats at work.   Target Date 06/28/22   Total Evaluation Time   PT Angel, Low Complexity Minutes (27163) 20   Therapy  Certification   Certification date from 05/03/22   Certification date to 08/01/22   Medical Diagnosis Strain of muscle of right groin region; Pelvic pain

## 2022-05-13 ENCOUNTER — HOSPITAL ENCOUNTER (OUTPATIENT)
Dept: PHYSICAL THERAPY | Facility: CLINIC | Age: 42
Setting detail: THERAPIES SERIES
Discharge: HOME | End: 2022-05-13
Attending: FAMILY MEDICINE
Payer: COMMERCIAL

## 2022-05-13 PROCEDURE — 97110 THERAPEUTIC EXERCISES: CPT | Mod: GP | Performed by: PHYSICAL THERAPIST

## 2022-05-31 ENCOUNTER — HOSPITAL ENCOUNTER (OUTPATIENT)
Dept: PHYSICAL THERAPY | Facility: CLINIC | Age: 42
Setting detail: THERAPIES SERIES
Discharge: HOME | End: 2022-05-31
Attending: FAMILY MEDICINE
Payer: COMMERCIAL

## 2022-05-31 PROCEDURE — 97110 THERAPEUTIC EXERCISES: CPT | Mod: GP | Performed by: PHYSICAL THERAPIST

## 2022-05-31 NOTE — PROGRESS NOTES
[facility]    Outpatient Physical Therapy Discharge Note  Patient: Junior Rodriguez  : 1980    Beginning/End Dates of Reporting Period:  5/3/22 to 22    Referring Provider: Dr. Navarro    Therapy Diagnosis: groin pain     Client Self Report: pt states since making the change in his gait pattern, his pain has been mostly non-exsistant. The pt was returning to [facility] last few weeks after being off for the winter, and kept later appointments to make sure the pt was doing well with his mobility and able to return fully to work. The pt states no pain has occurred at work during this time.    Objective Measurements:  Objective Measure: strength  Details: hip abd- 5/5, ER- 5/5, IR- 5/5; abs- 3+  Objective Measure: ROM  Details: IR- 30 degrees in 90/90            Goals:  Goal Identifier ST HEP   Goal Description Pt will be independent wtih his HEP in 2 weeks to be able to continue with strengthening at home to reduce discomfort wtih squatting at work.   Target Date 22   Date Met  22   Progress (detail required for progress note):  Pt has been completing his exercises at home and doing well with only a few cues in PT.     Goal Identifier LT strength goal   Goal Description Pt will have 5/5 LE strength in 8 weeks to be jessica to bend down at work and hold the position.   Target Date 22   Date Met  22   Progress (detail required for progress note):  Since pain has decreased, pt's strength has returned and he is at full strength and all work tasks.     Goal Identifier ST ROM goal   Goal Description Pt will have 30 degrees of IR in 90/90 to 4 weeks to improve his mobility for performing squats at work.   Target Date 22   Date Met  22   Progress (detail required for progress note):   Since a decrease in pain, the pt's ROM has returned and no irritation to the area.          Plan:  Discharge from therapy.    Discharge:    Reason for Discharge: Patient has met all goals.  Patient chooses to discontinue therapy.    Equipment Issued: None    Discharge Plan: Patient to continue home program.    Nichole Alicea, PT, DPT

## 2022-11-08 ENCOUNTER — HOSPITAL ENCOUNTER (EMERGENCY)
Facility: CLINIC | Age: 42
Discharge: HOME | End: 2022-11-08
Attending: PHYSICIAN ASSISTANT | Admitting: PHYSICIAN ASSISTANT
Payer: COMMERCIAL

## 2022-11-08 VITALS
OXYGEN SATURATION: 98 % | DIASTOLIC BLOOD PRESSURE: 81 MMHG | HEART RATE: 74 BPM | TEMPERATURE: 97.3 F | RESPIRATION RATE: 20 BRPM | SYSTOLIC BLOOD PRESSURE: 131 MMHG

## 2022-11-08 DIAGNOSIS — L72.9 INFECTED CYST OF SKIN: ICD-10-CM

## 2022-11-08 DIAGNOSIS — I88.9 LYMPHADENITIS: ICD-10-CM

## 2022-11-08 DIAGNOSIS — R22.30 LUMP ON FINGER, UNSPECIFIED LATERALITY: ICD-10-CM

## 2022-11-08 DIAGNOSIS — L08.9 INFECTED CYST OF SKIN: ICD-10-CM

## 2022-11-08 PROCEDURE — G0463 HOSPITAL OUTPT CLINIC VISIT: HCPCS | Performed by: PHYSICIAN ASSISTANT

## 2022-11-08 PROCEDURE — 99213 OFFICE O/P EST LOW 20 MIN: CPT | Performed by: PHYSICIAN ASSISTANT

## 2022-11-08 RX ORDER — CEPHALEXIN 500 MG/1
500 CAPSULE ORAL 4 TIMES DAILY
Qty: 28 CAPSULE | Refills: 0 | Status: SHIPPED | OUTPATIENT
Start: 2022-11-08 | End: 2022-11-15

## 2022-11-08 NOTE — DISCHARGE INSTRUCTIONS
Use medications as directed  Otology referral given for lump on finger that has been there for several years.    Warm heat to area.   Tylenol/Ibuprofen over the counter as discussed for pain as long as no contraindications or allergies.     Return to clinic or follow up with primary care provider for recheck in 3-4 days.  To ER if any worsening symptoms at any time, you note the redness expanding outside the margins, red streaking, or fevers.     Patient voiced understanding of instructions given.      DISPLAY PLAN FREE TEXT

## 2022-11-08 NOTE — ED TRIAGE NOTES
Pt reports lump on head x2 dayswith no known injury. States he popped the lump and woke up to pain with headaches from top of right head down to right side of neck yesterday.     Pt also reports white hard lump on left index finger.

## 2022-11-11 ASSESSMENT — ENCOUNTER SYMPTOMS
MUSCULOSKELETAL NEGATIVE: 1
DIAPHORESIS: 0
PSYCHIATRIC NEGATIVE: 1
RESPIRATORY NEGATIVE: 1
CHILLS: 0
HEADACHES: 0
GASTROINTESTINAL NEGATIVE: 1
FEVER: 0
EYES NEGATIVE: 1
CARDIOVASCULAR NEGATIVE: 1

## 2022-11-11 NOTE — ED PROVIDER NOTES
History     Chief Complaint   Patient presents with     Derm Problem     HPI  Junior Rodriguez is a 42 year old male who presents today with a cyst to his right scalp that was red and irritated few days ago.  Patient states he tried to pop it and got some purulent drainage out of it the other day but now the pain is moving down behind his ear and into his neck with swollen lymph nodes and tender lymph nodes.  He denies any fevers or chills, dysphonia, swelling to the neck, ear pain, sore throat, sinus pain or pressure. Last Tdap was in 2015.    Patient also notes that he has had a lump on his finger for years and is wondering if he could get a referral for this.  Dermatology referral will be placed today    Allergies:  No Known Allergies    Problem List:    Patient Active Problem List    Diagnosis Date Noted     CARDIOVASCULAR SCREENING; LDL GOAL LESS THAN 130 07/02/2015     Priority: Medium     Other specified disorder of male genital organs(608.89) 12/08/2014     Priority: Medium     Dyspnea and respiratory abnormality 01/15/2013     Priority: Medium     Tobacco use disorder 02/26/2009     Priority: Medium     Herpes zoster 02/05/2009     Priority: Medium     Overview:   First in highschool.  Repeatedly happening since then.  10 occurances  Occur on right side of face.          Past Medical History:    Past Medical History:   Diagnosis Date     Epididymitis      Multiple joint pain      Pancreatic cyst      Tobacco use        Past Surgical History:    Past Surgical History:   Procedure Laterality Date     DENERVATION OF SPERMATIC CORD MICROSURGICAL Left 4/6/2015    Procedure: DENERVATION OF SPERMATIC CORD MICROSURGICAL;  Surgeon: Kiko Morgan MD;  Location: [facility]     EPIDIDYMECTOMY  8/2014     ESOPHAGOSCOPY, GASTROSCOPY, DUODENOSCOPY (EGD), COMBINED N/A 4/19/2022    Procedure: ESOPHAGOGASTRODUODENOSCOPY, WITH FINE NEEDLE ASPIRATION BIOPSY, WITH ENDOSCOPIC ULTRASOUND GUIDANCE;  Surgeon: Guru Robi  Kirt Massey MD;  Location:  GI     VASECTOMY  2/2013       Family History:    Family History   Problem Relation Age of Onset     Neurologic Disorder Mother         MS     Cancer Mother         lung     Chronic Obstructive Pulmonary Disease Maternal Grandmother      Alzheimer Disease Paternal Grandmother      Prostate Cancer No family hx of      Cancer - colorectal No family hx of        Social History:  Marital Status:   [2]  Social History     Tobacco Use     Smoking status: Every Day     Packs/day: 1.00     Years: 30.00     Pack years: 30.00     Types: Cigarettes     Smokeless tobacco: Never     Tobacco comments:     Plans to quit in future   Vaping Use     Vaping Use: Never used   Substance Use Topics     Alcohol use: No     Drug use: No        Medications:    cephALEXin (KEFLEX) 500 MG capsule  cyclobenzaprine (FLEXERIL) 5 MG tablet  meloxicam (MOBIC) 15 MG tablet  omeprazole 20 MG tablet          Review of Systems   Constitutional: Negative for chills, diaphoresis and fever.   HENT: Negative.    Eyes: Negative.    Respiratory: Negative.    Cardiovascular: Negative.    Gastrointestinal: Negative.    Musculoskeletal: Negative.    Skin:        Lump to head and lymph node swelling and pain.    Neurological: Negative for headaches.   Psychiatric/Behavioral: Negative.    All other systems reviewed and are negative.      Physical Exam   BP: 131/81  Pulse: 74  Temp: 97.3  F (36.3  C)  Resp: 20  SpO2: 98 %      Physical Exam  Vitals and nursing note reviewed.   Constitutional:       General: He is not in acute distress.     Appearance: Normal appearance. He is normal weight. He is not ill-appearing, toxic-appearing or diaphoretic.   HENT:      Right Ear: Tympanic membrane and ear canal normal.      Left Ear: Tympanic membrane and ear canal normal.      Ears:      Comments: No mastoid tenderness     Nose: Nose normal.      Mouth/Throat:      Mouth: Mucous membranes are moist.      Pharynx: Oropharynx is  clear.   Eyes:      Extraocular Movements: Extraocular movements intact.      Conjunctiva/sclera: Conjunctivae normal.      Pupils: Pupils are equal, round, and reactive to light.   Neck:      Vascular: No carotid bruit.      Trachea: Trachea and phonation normal.      Comments: Right-sided posterior auricular lymph node swelling and tenderness.  Cardiovascular:      Rate and Rhythm: Normal rate and regular rhythm.      Heart sounds: Normal heart sounds.   Pulmonary:      Effort: Pulmonary effort is normal.      Breath sounds: Normal breath sounds.   Musculoskeletal:      Cervical back: Normal range of motion and neck supple. Tenderness present. No erythema, rigidity or crepitus. No pain with movement. Normal range of motion.   Lymphadenopathy:      Cervical: Cervical adenopathy present.   Skin:     General: Skin is warm.      Capillary Refill: Capillary refill takes less than 2 seconds.      Findings: Erythema (Over area of drained cyst/abscess.  Slight tenderness still remains to the area on the right scalp.) present. No rash.   Neurological:      General: No focal deficit present.      Mental Status: He is alert and oriented to person, place, and time.   Psychiatric:         Mood and Affect: Mood normal.         Behavior: Behavior normal.         Thought Content: Thought content normal.         Judgment: Judgment normal.         ED Course                 Procedures             Critical Care time:  none               No results found for this or any previous visit (from the past 24 hour(s)).    Medications - No data to display    Assessments & Plan (with Medical Decision Making)     I have reviewed the nursing notes.    I have reviewed the findings, diagnosis, plan and need for follow up with the patient.    Junior Rodriguez is a 42 year old male who presents today with a cyst to his right scalp that was red and irritated few days ago.  Patient states he tried to pop it and got some purulent drainage out of it the  other day but now the pain is moving down behind his ear and into his neck with swollen lymph nodes and tender lymph nodes.  He denies any fevers or chills, dysphonia, swelling to the neck, ear pain, sore throat, sinus pain or pressure. Last Tdap was in 2015.      Area to the head is tender but no remaining abscess or cyst noted.  Slight erythema to the area.  Positive swollen and tender lymphadenopathy to the posterior auricular area and submandibular region on the right side head and neck.  No mastoid tenderness or concern for deep tissue infection.  Patient sent home with prescription Keflex to treat cellulitis and adenitis.  Patient continue warm compress on his head, Tylenol and ibuprofen over-the-counter as no allergies or contraindications.  Dermatology referral placed for lump on finger that has been there for years.  Patient discharged in stable condition.  No concerns at this time for mastoiditis.    Discharge Medication List as of 11/8/2022  4:09 PM      START taking these medications    Details   cephALEXin (KEFLEX) 500 MG capsule Take 1 capsule (500 mg) by mouth 4 times daily for 7 days, Disp-28 capsule, R-0, E-Prescribe             Final diagnoses:   Infected cyst of skin   Lymphadenitis - right posterior auricular and submandibular   Lump on finger, unspecified laterality - for years; will refer to dermatology        11/8/2022   [facility] EMERGENCY DEPT

## 2022-11-28 ENCOUNTER — OFFICE VISIT (OUTPATIENT)
Dept: FAMILY MEDICINE | Facility: CLINIC | Age: 42
End: 2022-11-28
Payer: COMMERCIAL

## 2022-11-28 VITALS
TEMPERATURE: 97.4 F | DIASTOLIC BLOOD PRESSURE: 70 MMHG | OXYGEN SATURATION: 99 % | HEIGHT: 71 IN | RESPIRATION RATE: 18 BRPM | WEIGHT: 195 LBS | HEART RATE: 74 BPM | BODY MASS INDEX: 27.3 KG/M2 | SYSTOLIC BLOOD PRESSURE: 110 MMHG

## 2022-11-28 DIAGNOSIS — I73.00 RAYNAUD'S PHENOMENON WITHOUT GANGRENE: Primary | ICD-10-CM

## 2022-11-28 DIAGNOSIS — L98.9 LESION OF FINGER: ICD-10-CM

## 2022-11-28 PROCEDURE — 99214 OFFICE O/P EST MOD 30 MIN: CPT | Performed by: PHYSICIAN ASSISTANT

## 2022-11-28 RX ORDER — AMLODIPINE BESYLATE 5 MG/1
5 TABLET ORAL DAILY
Qty: 90 TABLET | Refills: 1 | Status: SHIPPED | OUTPATIENT
Start: 2022-11-28 | End: 2023-10-25

## 2022-11-28 ASSESSMENT — PAIN SCALES - GENERAL: PAINLEVEL: SEVERE PAIN (6)

## 2022-11-28 NOTE — PATIENT INSTRUCTIONS
Start Amlodipine for Raynauds. This should help with the pain and having decreased episodes of this pain. We can increase dose if needed based on how you respond to this.     Follow-up with Dermatology for your finger lesion.

## 2022-11-28 NOTE — NURSING NOTE
"Chief Complaint   Patient presents with     Finger     Musculoskeletal Problem       Initial /70   Pulse 74   Temp 97.4  F (36.3  C) (Tympanic)   Resp 18   Ht 1.803 m (5' 11\")   Wt 88.5 kg (195 lb)   SpO2 99%   BMI 27.20 kg/m   Estimated body mass index is 27.2 kg/m  as calculated from the following:    Height as of this encounter: 1.803 m (5' 11\").    Weight as of this encounter: 88.5 kg (195 lb).    Patient presents to the clinic using     Is there anyone who you would like to be able to receive your results?   If yes have patient fill out MARVIN    "

## 2022-11-28 NOTE — PROGRESS NOTES
"  Assessment & Plan   Raynaud's phenomenon without gangrene  Pt would like to treated now. Rx for Amlodipine. Discussed that this is a blood pressure medicine and side effects could include dizziness, lightheadedness, LE edema. Pt verbalized understanding. Will return to clinic as needed if symptoms not controlled.   - amLODIPine (NORVASC) 5 MG tablet; Take 1 tablet (5 mg) by mouth daily    Lesion of finger  Already referred to the dermatologist. Recommend follow-up with them given location over the area of the digital nerves. Lesion has been present for years.      BMI:   Estimated body mass index is 27.2 kg/m  as calculated from the following:    Height as of this encounter: 1.803 m (5' 11\").    Weight as of this encounter: 88.5 kg (195 lb).     No follow-ups on file.    JEANETH Amato [facility]    Karl Pacheco is a 42 year old, presenting for the following health issues:  Finger and Musculoskeletal Problem      History of Present Illness       Reason for visit:  Finger problom and talk about getting on a winter med formy hands and feet  Symptom onset:  More than a month  Symptoms include:  Pain in finger and its cold out  Symptom intensity:  Moderate  Symptom progression:  Worsening    He eats 2-3 servings of fruits and vegetables daily.He consumes 4 sweetened beverage(s) daily.He exercises with enough effort to increase his heart rate 60 or more minutes per day.  He exercises with enough effort to increase his heart rate 7 days per week.   He is taking medications regularly.     Concern - Multiple body aches with cold weather   Onset: worse in past 5 noted in past 10  Description: hands and feet get cold   Intensity: mild, moderate  Progression of Symptoms:  worsening and intermittent  Accompanying Signs & Symptoms: cold hands and feet  Previous history of similar problem: yes  Precipitating factors:        Worsened by: cold weather  Alleviating factors:        Improved " "by:   Therapies tried and outcome:  none     Review of Systems   See HPI         Objective    /70   Pulse 74   Temp 97.4  F (36.3  C) (Tympanic)   Resp 18   Ht 1.803 m (5' 11\")   Wt 88.5 kg (195 lb)   SpO2 99%   BMI 27.20 kg/m    Body mass index is 27.2 kg/m .  Physical Exam   Constitutional: healthy, alert, and no distress  Head: Normocephalic. Atraumatic  Eyes: No conjunctival injection, sclera anicteric  Respiratory: No resp distress.  Musculoskeletal: extremities normal- no gross deformities noted, and normal muscle tone  Neurologic: Gait normal. CN 2-12 grossly intact  Psychiatric: mentation appears normal and affect normal/bright               "

## 2022-12-07 PROBLEM — I73.00 RAYNAUD'S PHENOMENON WITHOUT GANGRENE: Status: ACTIVE | Noted: 2022-12-07

## 2023-01-24 ENCOUNTER — OFFICE VISIT (OUTPATIENT)
Dept: DERMATOLOGY | Facility: CLINIC | Age: 43
End: 2023-01-24
Payer: COMMERCIAL

## 2023-01-24 DIAGNOSIS — L21.9 DERMATITIS, SEBORRHEIC: Primary | ICD-10-CM

## 2023-01-24 DIAGNOSIS — D48.5 NEOPLASM OF UNCERTAIN BEHAVIOR OF SKIN: ICD-10-CM

## 2023-01-24 DIAGNOSIS — B07.8 COMMON WART: ICD-10-CM

## 2023-01-24 PROCEDURE — 17110 DESTRUCTION B9 LES UP TO 14: CPT | Performed by: PHYSICIAN ASSISTANT

## 2023-01-24 PROCEDURE — 99204 OFFICE O/P NEW MOD 45 MIN: CPT | Mod: 25 | Performed by: PHYSICIAN ASSISTANT

## 2023-01-24 RX ORDER — KETOCONAZOLE 20 MG/G
CREAM TOPICAL
Qty: 60 G | Refills: 5 | Status: SHIPPED | OUTPATIENT
Start: 2023-01-24 | End: 2024-04-15

## 2023-01-24 RX ORDER — TACROLIMUS 1 MG/G
OINTMENT TOPICAL
Qty: 30 G | Refills: 6 | Status: SHIPPED | OUTPATIENT
Start: 2023-01-24 | End: 2024-04-15

## 2023-01-24 ASSESSMENT — PAIN SCALES - GENERAL: PAINLEVEL: MILD PAIN (2)

## 2023-01-24 NOTE — NURSING NOTE
Chief Complaint   Patient presents with     Derm Problem     Bump L index finger and red blotchy patches on face       There were no vitals filed for this visit.  Wt Readings from Last 1 Encounters:   11/28/22 88.5 kg (195 lb)       Ericka Springer LPN .................1/24/2023

## 2023-01-24 NOTE — LETTER
1/24/2023         RE: Junior Rodriguez  [id number]  [place] [address]        Dear Colleague,    Thank you for referring your patient, Junior Rodriguez, to the [facility]. Please see a copy of my visit note below.    Junior Rodriguez is an extremely pleasant 42 year old year old male patient here today for bump on left hand index finger. Present for years, he notes it will get irritated. He will pick at spot, tried to drain it in the past with no results.. he also notes blotchy intermittent rash on face. Wart on left leg that he would like treated. Patient has no other skin complaints today.  Remainder of the HPI, Meds, PMH, Allergies, FH, and SH was reviewed in chart.    Past Medical History:   Diagnosis Date     Epididymitis      Multiple joint pain      Pancreatic cyst      Tobacco use        Past Surgical History:   Procedure Laterality Date     DENERVATION OF SPERMATIC CORD MICROSURGICAL Left 4/6/2015    Procedure: DENERVATION OF SPERMATIC CORD MICROSURGICAL;  Surgeon: Kiko Morgan MD;  Location: [facility] OR     EPIDIDYMECTOMY  8/2014     ESOPHAGOSCOPY, GASTROSCOPY, DUODENOSCOPY (EGD), COMBINED N/A 4/19/2022    Procedure: ESOPHAGOGASTRODUODENOSCOPY, WITH FINE NEEDLE ASPIRATION BIOPSY, WITH ENDOSCOPIC ULTRASOUND GUIDANCE;  Surgeon: Guru Kirt Rosenbaum MD;  Location: [facility]     VASECTOMY  2/2013        Family History   Problem Relation Age of Onset     Neurologic Disorder Mother         MS     Cancer Mother         lung     Chronic Obstructive Pulmonary Disease Maternal Grandmother      Alzheimer Disease Paternal Grandmother      Prostate Cancer No family hx of      Cancer - colorectal No family hx of        Social History     Socioeconomic History     Marital status:      Spouse name: Not on file     Number of children: Not on file     Years of education: Not on file     Highest education level: Not on file   Occupational History     Not on file   Tobacco Use      Smoking status: Every Day     Packs/day: 1.00     Years: 30.00     Pack years: 30.00     Types: Cigarettes     Smokeless tobacco: Never     Tobacco comments:     Plans to quit in future   Vaping Use     Vaping Use: Never used   Substance and Sexual Activity     Alcohol use: No     Drug use: No     Sexual activity: Yes     Partners: Female     Birth control/protection: Surgical   Other Topics Concern     Parent/sibling w/ CABG, MI or angioplasty before 65F 55M? Yes     Comment: uncle at age 29   Social History Narrative     Not on file     Social Determinants of Health     Financial Resource Strain: Not on file   Food Insecurity: Not on file   Transportation Needs: Not on file   Physical Activity: Not on file   Stress: Not on file   Social Connections: Not on file   Intimate Partner Violence: Not on file   Housing Stability: Not on file       Outpatient Encounter Medications as of 1/24/2023   Medication Sig Dispense Refill     ketoconazole (NIZORAL) 2 % external cream Apply daily to face. 60 g 5     tacrolimus (PROTOPIC) 0.1 % external ointment Apply sparingly twice daily if rash is inflamed on face. 30 g 6     amLODIPine (NORVASC) 5 MG tablet Take 1 tablet (5 mg) by mouth daily 90 tablet 1     No facility-administered encounter medications on file as of 1/24/2023.             O:   NAD, WDWN, Alert & Oriented, Mood & Affect wnl, Vitals stable   Here today alone   There were no vitals taken for this visit.   General appearance normal   Vitals stable   Alert, oriented and in no acute distress     Verrucous papule on left calf  Mild pink dry patches on face  Slightly pink firm papule on left index finger       Eyes: Conjunctivae/lids:Normal     ENT: Lips: normal    MSK:Normal    Pulm: Breathing Normal    Neuro/Psych: Orientation:Alert and Orientedx3 ; Mood/Affect:normal   A/P:  1. Common wart x 1 on left calf   LN2:  Treated with LN2 for 5s for 1-2 cycles. Warned risks of blistering, pain, pigment change, scarring,  and incomplete resolution.  Advised patient to return if lesions do not completely resolve.  Wound care sheet given.  2. Seborrheic dermatitis   Apply ketoconazole cream daily to face.   If inflamed use protopic sparingly as needed.   3. Dermatofibroma on left index finger?  Favor DF over cyst, will schedule with Dr. Welch for excision since bothersome.       Again, thank you for allowing me to participate in the care of your patient.        Sincerely,        Aggie Arreguin PA-C

## 2023-01-25 NOTE — PROGRESS NOTES
Junior Rodriguez is an extremely pleasant 42 year old year old male patient here today for bump on left hand index finger. Present for years, he notes it will get irritated. He will pick at spot, tried to drain it in the past with no results.. he also notes blotchy intermittent rash on face. Wart on left leg that he would like treated. Patient has no other skin complaints today.  Remainder of the HPI, Meds, PMH, Allergies, FH, and SH was reviewed in chart.    Past Medical History:   Diagnosis Date     Epididymitis      Multiple joint pain      Pancreatic cyst      Tobacco use        Past Surgical History:   Procedure Laterality Date     DENERVATION OF SPERMATIC CORD MICROSURGICAL Left 4/6/2015    Procedure: DENERVATION OF SPERMATIC CORD MICROSURGICAL;  Surgeon: Kiko Morgan MD;  Location: [facility]     EPIDIDYMECTOMY  8/2014     ESOPHAGOSCOPY, GASTROSCOPY, DUODENOSCOPY (EGD), COMBINED N/A 4/19/2022    Procedure: ESOPHAGOGASTRODUODENOSCOPY, WITH FINE NEEDLE ASPIRATION BIOPSY, WITH ENDOSCOPIC ULTRASOUND GUIDANCE;  Surgeon: Guru Kirt Rosenbaum MD;  Location: [facility]     VASECTOMY  2/2013        Family History   Problem Relation Age of Onset     Neurologic Disorder Mother         MS     Cancer Mother         lung     Chronic Obstructive Pulmonary Disease Maternal Grandmother      Alzheimer Disease Paternal Grandmother      Prostate Cancer No family hx of      Cancer - colorectal No family hx of        Social History     Socioeconomic History     Marital status:      Spouse name: Not on file     Number of children: Not on file     Years of education: Not on file     Highest education level: Not on file   Occupational History     Not on file   Tobacco Use     Smoking status: Every Day     Packs/day: 1.00     Years: 30.00     Pack years: 30.00     Types: Cigarettes     Smokeless tobacco: Never     Tobacco comments:     Plans to quit in future   Vaping Use     Vaping Use: Never used   Substance and  Sexual Activity     Alcohol use: No     Drug use: No     Sexual activity: Yes     Partners: Female     Birth control/protection: Surgical   Other Topics Concern     Parent/sibling w/ CABG, MI or angioplasty before 65F 55M? Yes     Comment: uncle at age 29   Social History Narrative     Not on file     Social Determinants of Health     Financial Resource Strain: Not on file   Food Insecurity: Not on file   Transportation Needs: Not on file   Physical Activity: Not on file   Stress: Not on file   Social Connections: Not on file   Intimate Partner Violence: Not on file   Housing Stability: Not on file       Outpatient Encounter Medications as of 1/24/2023   Medication Sig Dispense Refill     ketoconazole (NIZORAL) 2 % external cream Apply daily to face. 60 g 5     tacrolimus (PROTOPIC) 0.1 % external ointment Apply sparingly twice daily if rash is inflamed on face. 30 g 6     amLODIPine (NORVASC) 5 MG tablet Take 1 tablet (5 mg) by mouth daily 90 tablet 1     No facility-administered encounter medications on file as of 1/24/2023.             O:   NAD, WDWN, Alert & Oriented, Mood & Affect wnl, Vitals stable   Here today alone   There were no vitals taken for this visit.   General appearance normal   Vitals stable   Alert, oriented and in no acute distress     Verrucous papule on left calf  Mild pink dry patches on face  Slightly pink firm papule on left index finger       Eyes: Conjunctivae/lids:Normal     ENT: Lips: normal    MSK:Normal    Pulm: Breathing Normal    Neuro/Psych: Orientation:Alert and Orientedx3 ; Mood/Affect:normal   A/P:  1. Common wart x 1 on left calf   LN2:  Treated with LN2 for 5s for 1-2 cycles. Warned risks of blistering, pain, pigment change, scarring, and incomplete resolution.  Advised patient to return if lesions do not completely resolve.  Wound care sheet given.  2. Seborrheic dermatitis   Apply ketoconazole cream daily to face.   If inflamed use protopic sparingly as needed.   3.  Dermatofibroma on left index finger?  Favor DF over cyst, will schedule with Dr. Welch for excision since bothersome.

## 2023-03-10 ENCOUNTER — TELEPHONE (OUTPATIENT)
Dept: DERMATOLOGY | Facility: CLINIC | Age: 43
End: 2023-03-10
Payer: COMMERCIAL

## 2023-03-10 NOTE — TELEPHONE ENCOUNTER
Called patient. Patient was scheduled with Dr. Welch. Per patient request appointment letter was mailed.   Ericka Springer LPN

## 2023-03-10 NOTE — TELEPHONE ENCOUNTER
[facility] Call Center    Phone Message    May a detailed message be left on voicemail: yes     Reason for Call: Other: Pt unable to keep morning excision appointment on 3/13. If possible please move to after 11am or reschedule for a different date. Please call.      Action Taken: Message routed to:  Other: [place] [facility]    Travel Screening: Not Applicable

## 2023-04-05 ENCOUNTER — OFFICE VISIT (OUTPATIENT)
Dept: DERMATOLOGY | Facility: CLINIC | Age: 43
End: 2023-04-05
Payer: COMMERCIAL

## 2023-04-05 VITALS — HEART RATE: 71 BPM | OXYGEN SATURATION: 100 % | DIASTOLIC BLOOD PRESSURE: 92 MMHG | SYSTOLIC BLOOD PRESSURE: 130 MMHG

## 2023-04-05 DIAGNOSIS — L72.0 EPIDERMAL CYST: Primary | ICD-10-CM

## 2023-04-05 PROCEDURE — 11422 EXC H-F-NK-SP B9+MARG 1.1-2: CPT | Performed by: DERMATOLOGY

## 2023-04-05 PROCEDURE — 88331 PATH CONSLTJ SURG 1 BLK 1SPC: CPT | Performed by: DERMATOLOGY

## 2023-04-05 PROCEDURE — 12041 INTMD RPR N-HF/GENIT 2.5CM/<: CPT | Performed by: DERMATOLOGY

## 2023-04-05 ASSESSMENT — PAIN SCALES - GENERAL: PAINLEVEL: NO PAIN (0)

## 2023-04-05 NOTE — NURSING NOTE
"Initial BP (!) 130/92   Pulse 71   SpO2 100%  Estimated body mass index is 27.2 kg/m  as calculated from the following:    Height as of 11/28/22: 1.803 m (5' 11\").    Weight as of 11/28/22: 88.5 kg (195 lb). .      "

## 2023-04-05 NOTE — LETTER
4/5/2023         RE: Junior Rodriguez  [address] [address] [facility] [address]  [facility] [address]        Dear Colleague,    Thank you for referring your patient, Junior Rodriguez, to the [facility]. Please see a copy of my visit note below.    Junior Rodriguez is an extremely pleasant 42 year old year old male patient here today for evaluation and managment of cyst on left index finger.  Patient has no other skin complaints today.  Remainder of the HPI, Meds, PMH, Allergies, FH, and SH was reviewed in chart.      Past Medical History:   Diagnosis Date     Epididymitis      Multiple joint pain      Pancreatic cyst      Tobacco use        Past Surgical History:   Procedure Laterality Date     DENERVATION OF SPERMATIC CORD MICROSURGICAL Left 4/6/2015    Procedure: DENERVATION OF SPERMATIC CORD MICROSURGICAL;  Surgeon: Kiko Morgan MD;  Location: [facility] OR     EPIDIDYMECTOMY  8/2014     ESOPHAGOSCOPY, GASTROSCOPY, DUODENOSCOPY (EGD), COMBINED N/A 4/19/2022    Procedure: ESOPHAGOGASTRODUODENOSCOPY, WITH FINE NEEDLE ASPIRATION BIOPSY, WITH ENDOSCOPIC ULTRASOUND GUIDANCE;  Surgeon: Guru Kirt Rosenbaum MD;  Location: [facility]     VASECTOMY  2/2013        Family History   Problem Relation Age of Onset     Neurologic Disorder Mother         MS     Cancer Mother         lung     Chronic Obstructive Pulmonary Disease Maternal Grandmother      Alzheimer Disease Paternal Grandmother      Prostate Cancer No family hx of      Cancer - colorectal No family hx of        Social History     Socioeconomic History     Marital status:      Spouse name: Not on file     Number of children: Not on file     Years of education: Not on file     Highest education level: Not on file   Occupational History     Not on file   Tobacco Use     Smoking status: Every Day     Packs/day: 1.00     Years: 30.00     Pack years: 30.00     Types: Cigarettes     Smokeless tobacco: Never     Tobacco comments:     Plans to quit in future    Vaping Use     Vaping status: Never Used   Substance and Sexual Activity     Alcohol use: No     Drug use: No     Sexual activity: Yes     Partners: Female     Birth control/protection: Surgical   Other Topics Concern     Parent/sibling w/ CABG, MI or angioplasty before 65F 55M? Yes     Comment: uncle at age 29   Social History Narrative     Not on file     Social Determinants of Health     Financial Resource Strain: Not on file   Food Insecurity: Not on file   Transportation Needs: Not on file   Physical Activity: Not on file   Stress: Not on file   Social Connections: Not on file   Intimate Partner Violence: Not on file   Housing Stability: Not on file       Outpatient Encounter Medications as of 4/5/2023   Medication Sig Dispense Refill     amLODIPine (NORVASC) 5 MG tablet Take 1 tablet (5 mg) by mouth daily 90 tablet 1     ketoconazole (NIZORAL) 2 % external cream Apply daily to face. 60 g 5     tacrolimus (PROTOPIC) 0.1 % external ointment Apply sparingly twice daily if rash is inflamed on face. 30 g 6     No facility-administered encounter medications on file as of 4/5/2023.             O:   NAD, WDWN, Alert & Oriented, Mood & Affect wnl, Vitals stable   Here today alone   BP (!) 130/92   Pulse 71   SpO2 100%    General appearance normal   Vitals stable   Alert, oriented and in no acute distress     L index finger movable 1.1cm nodule      Eyes: Conjunctivae/lids:Normal     ENT: Lips, buccal mucosa, tongue: normal    MSK:Normal    Cardiovascular: peripheral edema none    Pulm: Breathing Normal    Neuro/Psych: Orientation:Alert and Orientedx3 ; Mood/Affect:normal       MICRO:   L index finger: cyst lined by stratified squamous epithelium with epidermal keratinization   A/P:  1. L index finger cyst   Scar and recurrence discussed with patient   EXCISION OF CYST AND INT: After thorough discussion of PGACAC, consent obtained, anesthesia and prep, the margins of the cyst were identified and an incision was made  encompassing the cyst. The incisions were made through the skin and down to and including the subcutaneous tissue. The cyst was removed en bloc and submitted for frozen pathologic review. hemostasis was achieved. The wound edges were then closed in a layered fashion with 4 vicryl and 5 fast, , being careful not to leave any dead space. Postoperative length was 1.1 cm.   EBL minimal; complications none; wound care routine. The patient was discharged in good condition and will return in one week for wound evaluation.  It was a pleasure speaking to Junior Rodriguez today.  Previous clinic notes and pertinent laboratory tests were reviewed prior to Junior Rodriguez's visit.        Again, thank you for allowing me to participate in the care of your patient.        Sincerely,        Juan Francisco Welch MD

## 2023-04-05 NOTE — PROGRESS NOTES
Junior Rodriguez is an extremely pleasant 42 year old year old male patient here today for evaluation and managment of cyst on left index finger.  Patient has no other skin complaints today.  Remainder of the HPI, Meds, PMH, Allergies, FH, and SH was reviewed in chart.      Past Medical History:   Diagnosis Date     Epididymitis      Multiple joint pain      Pancreatic cyst      Tobacco use        Past Surgical History:   Procedure Laterality Date     DENERVATION OF SPERMATIC CORD MICROSURGICAL Left 4/6/2015    Procedure: DENERVATION OF SPERMATIC CORD MICROSURGICAL;  Surgeon: Kiko Morgan MD;  [facility]     EPIDIDYMECTOMY  8/2014     ESOPHAGOSCOPY, GASTROSCOPY, DUODENOSCOPY (EGD), COMBINED N/A 4/19/2022    Procedure: ESOPHAGOGASTRODUODENOSCOPY, WITH FINE NEEDLE ASPIRATION BIOPSY, WITH ENDOSCOPIC ULTRASOUND GUIDANCE;  Surgeon: Guru Kirt Rosenbaum MD;  [facility]     VASECTOMY  2/2013        Family History   Problem Relation Age of Onset     Neurologic Disorder Mother         MS     Cancer Mother         lung     Chronic Obstructive Pulmonary Disease Maternal Grandmother      Alzheimer Disease Paternal Grandmother      Prostate Cancer No family hx of      Cancer - colorectal No family hx of        Social History     Socioeconomic History     Marital status:      Spouse name: Not on file     Number of children: Not on file     Years of education: Not on file     Highest education level: Not on file   Occupational History     Not on file   Tobacco Use     Smoking status: Every Day     Packs/day: 1.00     Years: 30.00     Pack years: 30.00     Types: Cigarettes     Smokeless tobacco: Never     Tobacco comments:     Plans to quit in future   Vaping Use     Vaping status: Never Used   Substance and Sexual Activity     Alcohol use: No     Drug use: No     Sexual activity: Yes     Partners: Female     Birth control/protection: Surgical   Other Topics Concern     Parent/sibling w/ CABG, MI  or angioplasty before 65F 55M? Yes     Comment: uncle at age 29   Social History Narrative     Not on file     Social Determinants of Health     Financial Resource Strain: Not on file   Food Insecurity: Not on file   Transportation Needs: Not on file   Physical Activity: Not on file   Stress: Not on file   Social Connections: Not on file   Intimate Partner Violence: Not on file   Housing Stability: Not on file       Outpatient Encounter Medications as of 4/5/2023   Medication Sig Dispense Refill     amLODIPine (NORVASC) 5 MG tablet Take 1 tablet (5 mg) by mouth daily 90 tablet 1     ketoconazole (NIZORAL) 2 % external cream Apply daily to face. 60 g 5     tacrolimus (PROTOPIC) 0.1 % external ointment Apply sparingly twice daily if rash is inflamed on face. 30 g 6     No facility-administered encounter medications on file as of 4/5/2023.             O:   NAD, WDWN, Alert & Oriented, Mood & Affect wnl, Vitals stable   Here today alone   BP (!) 130/92   Pulse 71   SpO2 100%    General appearance normal   Vitals stable   Alert, oriented and in no acute distress     L index finger movable 1.1cm nodule      Eyes: Conjunctivae/lids:Normal     ENT: Lips, buccal mucosa, tongue: normal    MSK:Normal    Cardiovascular: peripheral edema none    Pulm: Breathing Normal    Neuro/Psych: Orientation:Alert and Orientedx3 ; Mood/Affect:normal       MICRO:   L index finger: cyst lined by stratified squamous epithelium with epidermal keratinization   A/P:  1. L index finger cyst   Scar and recurrence discussed with patient   EXCISION OF CYST AND INT: After thorough discussion of PGACAC, consent obtained, anesthesia and prep, the margins of the cyst were identified and an incision was made encompassing the cyst. The incisions were made through the skin and down to and including the subcutaneous tissue. The cyst was removed en bloc and submitted for frozen pathologic review. hemostasis was achieved. The wound edges were then closed in a  layered fashion with 4 vicryl and 5 fast, , being careful not to leave any dead space. Postoperative length was 1.1 cm.   EBL minimal; complications none; wound care routine. The patient was discharged in good condition and will return in one week for wound evaluation.  It was a pleasure speaking to Junior Rodriguez today.  Previous clinic notes and pertinent laboratory tests were reviewed prior to Junior Rodriguez's visit.

## 2023-04-05 NOTE — PATIENT INSTRUCTIONS
Sutured Wound Care     [facility]: [phone]    [facility]: [phone]          No strenuous activity for 48 hours. Resume moderate activity in 48 hours. No heavy exercising until you are seen for follow up in one week.     Take Tylenol as needed for discomfort.                         Do not drink alcoholic beverages for 48 hours.     Keep the pressure bandage in place for 24 hours. If the bandage becomes blood tinged or loose, reinforce it with gauze and tape.        (Refer to the reverse side of this page for management of bleeding).    Remove pressure bandage in 24 hours (White gauze and brown wrap)    Leave the flat bandage in place until your follow up appointment.    Keep the bandage dry. Wash around it carefully.    If the tape becomes soiled or starts to come off, reinforce it with additional paper tape.    Do not smoke for 3 weeks; smoking is detrimental to wound healing.    It is normal to have swelling and bruising around the surgical site. The bruising will fade in approximately 10-14 days. Elevate the area to reduce swelling.    Numbness, itchiness and sensitivity to temperature changes can occur after surgery and may take up to 18 months to normalize.      POSSIBLE COMPLICATIONS    BLEEDING:    Leave the bandage in place.  Use tightly rolled up gauze or a cloth to apply direct pressure over the bandage for 20   minutes.  Reapply pressure for an additional 20 minutes if necessary  Call the office or go to the nearest emergency room if pressure fails to stop the bleeding.  Use additional gauze and tape to maintain pressure once the bleeding has stopped.        PAIN:    Post operative pain should slowly get better, never worse.  A severe increase in pain may indicate a problem. Call the office if this occurs.    In case of emergency phone:Dr Welch [phone]

## 2023-10-25 ENCOUNTER — TELEPHONE (OUTPATIENT)
Dept: FAMILY MEDICINE | Facility: CLINIC | Age: 43
End: 2023-10-25
Payer: COMMERCIAL

## 2023-10-25 DIAGNOSIS — I73.00 RAYNAUD'S PHENOMENON WITHOUT GANGRENE: ICD-10-CM

## 2023-10-25 RX ORDER — AMLODIPINE BESYLATE 5 MG/1
5 TABLET ORAL DAILY
Qty: 90 TABLET | Refills: 0 | Status: SHIPPED | OUTPATIENT
Start: 2023-10-25 | End: 2024-01-31

## 2023-10-25 NOTE — TELEPHONE ENCOUNTER
"Wife Anuja calls the clinic to ask for a refill for patient (consent on file), Anuja says the Amlodipine has helped with patient's circulation and has made a \"night and day difference.\" Blood pressures at home have been in the normal range. Refill provided to requested pharmacy, told Anuja patient would need appointment later in November and was sent to  to arrange that appointment.      MARIKA David    "

## 2024-01-30 DIAGNOSIS — I73.00 RAYNAUD'S PHENOMENON WITHOUT GANGRENE: ICD-10-CM

## 2024-01-31 RX ORDER — AMLODIPINE BESYLATE 5 MG/1
5 TABLET ORAL DAILY
Qty: 30 TABLET | Refills: 0 | Status: SHIPPED | OUTPATIENT
Start: 2024-01-31 | End: 2024-03-14

## 2024-01-31 RX ORDER — AMLODIPINE BESYLATE 5 MG/1
5 TABLET ORAL DAILY
Qty: 90 TABLET | Refills: 0 | OUTPATIENT
Start: 2024-01-31

## 2024-01-31 NOTE — TELEPHONE ENCOUNTER
Overdue for needed care. Please call to schedule annual exam.    Padmini Vick RN  [facility]

## 2024-03-14 ENCOUNTER — OFFICE VISIT (OUTPATIENT)
Dept: FAMILY MEDICINE | Facility: CLINIC | Age: 44
End: 2024-03-14
Payer: COMMERCIAL

## 2024-03-14 VITALS
HEART RATE: 75 BPM | SYSTOLIC BLOOD PRESSURE: 118 MMHG | BODY MASS INDEX: 29.99 KG/M2 | TEMPERATURE: 97 F | WEIGHT: 214.2 LBS | OXYGEN SATURATION: 99 % | HEIGHT: 71 IN | DIASTOLIC BLOOD PRESSURE: 80 MMHG | RESPIRATION RATE: 20 BRPM

## 2024-03-14 DIAGNOSIS — F17.200 TOBACCO USE DISORDER: ICD-10-CM

## 2024-03-14 DIAGNOSIS — M25.561 ACUTE PAIN OF RIGHT KNEE: ICD-10-CM

## 2024-03-14 DIAGNOSIS — Z00.00 ROUTINE GENERAL MEDICAL EXAMINATION AT A HEALTH CARE FACILITY: ICD-10-CM

## 2024-03-14 DIAGNOSIS — Z13.1 SCREENING FOR DIABETES MELLITUS: ICD-10-CM

## 2024-03-14 DIAGNOSIS — F12.10 CANNABIS ABUSE, DAILY USE: ICD-10-CM

## 2024-03-14 DIAGNOSIS — Z13.6 CARDIOVASCULAR SCREENING; LDL GOAL LESS THAN 130: ICD-10-CM

## 2024-03-14 DIAGNOSIS — I73.00 RAYNAUD'S PHENOMENON WITHOUT GANGRENE: Primary | ICD-10-CM

## 2024-03-14 LAB
ALBUMIN SERPL BCG-MCNC: 4.3 G/DL (ref 3.5–5.2)
ALP SERPL-CCNC: 66 U/L (ref 40–150)
ALT SERPL W P-5'-P-CCNC: 18 U/L (ref 0–70)
ANION GAP SERPL CALCULATED.3IONS-SCNC: 9 MMOL/L (ref 7–15)
AST SERPL W P-5'-P-CCNC: 23 U/L (ref 0–45)
BILIRUB SERPL-MCNC: 0.3 MG/DL
BUN SERPL-MCNC: 12.7 MG/DL (ref 6–20)
CALCIUM SERPL-MCNC: 9.1 MG/DL (ref 8.6–10)
CHLORIDE SERPL-SCNC: 103 MMOL/L (ref 98–107)
CHOLEST SERPL-MCNC: 203 MG/DL
CREAT SERPL-MCNC: 0.99 MG/DL (ref 0.67–1.17)
DEPRECATED HCO3 PLAS-SCNC: 28 MMOL/L (ref 22–29)
EGFRCR SERPLBLD CKD-EPI 2021: >90 ML/MIN/1.73M2
FASTING STATUS PATIENT QL REPORTED: YES
GLUCOSE SERPL-MCNC: 88 MG/DL (ref 70–99)
HBA1C MFR BLD: 5.6 % (ref 0–5.6)
HDLC SERPL-MCNC: 35 MG/DL
LDLC SERPL CALC-MCNC: 130 MG/DL
NONHDLC SERPL-MCNC: 168 MG/DL
POTASSIUM SERPL-SCNC: 4 MMOL/L (ref 3.4–5.3)
PROT SERPL-MCNC: 7.2 G/DL (ref 6.4–8.3)
SODIUM SERPL-SCNC: 140 MMOL/L (ref 135–145)
TRIGL SERPL-MCNC: 188 MG/DL

## 2024-03-14 PROCEDURE — 36415 COLL VENOUS BLD VENIPUNCTURE: CPT | Performed by: PHYSICIAN ASSISTANT

## 2024-03-14 PROCEDURE — 80061 LIPID PANEL: CPT | Performed by: PHYSICIAN ASSISTANT

## 2024-03-14 PROCEDURE — 99213 OFFICE O/P EST LOW 20 MIN: CPT | Mod: 25 | Performed by: PHYSICIAN ASSISTANT

## 2024-03-14 PROCEDURE — 99396 PREV VISIT EST AGE 40-64: CPT | Performed by: PHYSICIAN ASSISTANT

## 2024-03-14 PROCEDURE — 83036 HEMOGLOBIN GLYCOSYLATED A1C: CPT | Performed by: PHYSICIAN ASSISTANT

## 2024-03-14 PROCEDURE — 80053 COMPREHEN METABOLIC PANEL: CPT | Performed by: PHYSICIAN ASSISTANT

## 2024-03-14 RX ORDER — AMLODIPINE BESYLATE 5 MG/1
5 TABLET ORAL DAILY
Qty: 90 TABLET | Refills: 3 | Status: SHIPPED | OUTPATIENT
Start: 2024-03-14

## 2024-03-14 RX ORDER — IBUPROFEN 800 MG/1
800 TABLET, FILM COATED ORAL EVERY 8 HOURS PRN
Qty: 90 TABLET | Refills: 3 | Status: SHIPPED | OUTPATIENT
Start: 2024-03-14 | End: 2024-04-26

## 2024-03-14 RX ORDER — IBUPROFEN 800 MG/1
800 TABLET, FILM COATED ORAL EVERY 8 HOURS PRN
COMMUNITY
End: 2024-03-14

## 2024-03-14 SDOH — HEALTH STABILITY: PHYSICAL HEALTH: ON AVERAGE, HOW MANY DAYS PER WEEK DO YOU ENGAGE IN MODERATE TO STRENUOUS EXERCISE (LIKE A BRISK WALK)?: 7 DAYS

## 2024-03-14 ASSESSMENT — SOCIAL DETERMINANTS OF HEALTH (SDOH): HOW OFTEN DO YOU GET TOGETHER WITH FRIENDS OR RELATIVES?: TWICE A WEEK

## 2024-03-14 ASSESSMENT — PAIN SCALES - GENERAL: PAINLEVEL: NO PAIN (0)

## 2024-03-14 NOTE — COMMUNITY RESOURCES LIST (ENGLISH)
March 14, 2024           YOUR PERSONALIZED LIST OF SERVICES & PROGRAMS           NAVIGATION    Eligibility Screening      [facility] Health [facility]  [address] [place] [address] (Distance: 24.9 miles)  Phone: [phone]) [phone]  Email: [email or web address]@[email or web address]  Website: https://[email or web address]/individual-health  Language: Spanish, English, Belarusian, Bulgarian, Greenlandic, Greek  Fee: Free  Accessibility: Ada accessible, Blind accommodation, Deaf or hard of hearing, Translation services      [employer] [place] - SNAP (formerly food stamps) Screening and Application help  Phone: (226) 241-4604  Website: https://www.Anhelosolutions.org/programs/[place]-food-helpline/  Language: English  Hours: Mon 10:00 AM - 5:00 PM Tue 10:00 AM - 5:00 PM Wed 10:00 AM - 5:00 PM Thu 10:00 AM - 5:00 PM Fri 10:00 AM - 5:00 PM  Fee: Free  Accessibility: Ada accessible, Blind accommodation, Deaf or hard of hearing, Translation services      [employer] - Housing Stabilization Services  Phone: (336) 759-7668  Website: https://[employer]/Housing-Stabilization.html  Language: English  Hours: Mon 9:00 AM - 5:00 PM Tue 9:00 AM - 5:00 PM Wed 9:00 AM - 5:00 PM Thu 9:00 AM - 5:00 PM Fri 9:00 AM - 5:00 PM  Fee: Insurance  Accessibility: Deaf or hard of hearing, Translation services        ASSISTANCE    Nutrition Benefits      [facility] [place] application assistance  [address] [place] [address] (Distance: 19.2 miles)  Phone: (518) 118-4812  Website: http://www.accap.org/  Language: English  Fee: Free  Accessibility: Ada accessible      [employer] - SNAP Enrollment  [address] [facility] [address] [place] [address] (Distance: 17.9 miles)  Phone: (374) 479-4208  Website: https://[facility].org/  Language: English, Czech  Fee: Free      [employer] [place] - SNAP (formerly food stamps) Screening and Application help  Phone: (126) 980-4435  Website:  https://www.[employer]iberna.org/programs/[place]-food-helpline/  Language: English  Hours: Mon 10:00 AM - 5:00 PM Tue 10:00 AM - 5:00 PM Wed 10:00 AM - 5:00 PM Thu 10:00 AM - 5:00 PM Fri 10:00 AM - 5:00 PM  Fee: Free  Accessibility: Ada accessible, Blind accommodation, Deaf or hard of hearing, Translation services    [facility]  [address] [place] [address] (Distance: 5.5 miles)  Phone: (172) 785-1288  Website: https://[employer]/location/82  Language: English  Fee: Self pay      [facility] [facility] [employer]  [address] [address] (Distance: 5.5 miles)  Phone: (736) 161-6070  Website: https://www.[employer]/about-us  Language: English  Fee: Self pay      [employer] [place] - Food Shelf   Phone: (854) 107-8855  Website: https://www.Hiberna.org/find-help/  Language: English  Hours: Mon 10:00 AM - 5:00 PM Tue 10:00 AM - 5:00 PM Wed 10:00 AM - 5:00 PM Thu 10:00 AM - 5:00 PM Fri 10:00 AM - 5:00 PM  Fee: Free  Accessibility: Ada accessible, Blind accommodation, Deaf or hard of hearing, Translation services               IMPORTANT NUMBERS & WEBSITES        Emergency Services  911  .   [facility]  211 http://211unitedway.org  .   Poison Control  (763) 212-5025 http://[place]poison.org http://wisconsinpoison.org  .     Suicide and Crisis Lifeline  988 http://988lifeline.org  .   Childhelp National Child Abuse Hotline  351.200.8347 http://Childhelphotline.org   .   National Sexual Assault Hotline  (366) 937-4050 (HOPE) http://Rainn.org   .     National Runaway Safeline  (380) 271-6273 (RUNAWAY) http://1800runaT[facility] Medical.org  .   Pregnancy & Postpartum Support  Call/text 049-186-5880  [place]: http://ppsupportmn.org  [place]: http://[employer].com/wi  .   Substance Abuse National Helpline ([facility])  744-591-LPJM (5442) http://Findtreatment.gov   .                DISCLAIMER: [employer]  does not endorse any service providers mentioned in this resource list.  [facility] does not guarantee that the services mentioned in this resource list will be available to you or will improve your health or wellness.    [facility]

## 2024-03-14 NOTE — PROGRESS NOTES
Preventive Care Visit  [facility]  Johnson Chowdhury PA-C, Family Medicine  Mar 14, 2024      Assessment & Plan   Raynaud's phenomenon without gangrene  Tolerating Amlodipine well. Symptoms are well controlled. Check CMP and follow-up in 1 year.   - amLODIPine (NORVASC) 5 MG tablet; Take 1 tablet (5 mg) by mouth daily  - Comprehensive metabolic panel; Future  - Comprehensive metabolic panel    Acute pain of right knee  No injury or trauma. Hx of similar pain in the past that has not lasted as long. Changes with the weather affect this. Pain only present for 1 week. Lateral aspect of the knee joint. TTP along the lateral joint line and along the distal IT band. Without trauma, effusion, do not suspect meniscus tear. More likely related to IT band issue, or LCL issue. Encouraged conservative management with NSAIDs, strengthening and stretching and follow-up in 2-3 months if not improved.   - ibuprofen (ADVIL/MOTRIN) 800 MG tablet; Take 1 tablet (800 mg) by mouth every 8 hours as needed for moderate pain    Routine general medical examination at a health care facility  43 yr old here for physical exam. Last physical exam done 1 year ago. Discussed preventative screenings which are updated below. Counseled on immunizations and healthy lifestyle. Follow-up in 1 year for repeat physical exam.     Cannabis abuse, daily use  Counseled on harm reduction. Encouraged to take tolerance breaks.     Tobacco use disorder  Not ready to quit just yet. In the contemplative state.     CARDIOVASCULAR SCREENING; LDL GOAL LESS THAN 130  Due for screening.   - Lipid panel reflex to direct LDL Fasting; Future    Screening for diabetes mellitus  Due for screening.   - Hemoglobin A1c; Future    Patient has been advised of split billing requirements and indicates understanding: Yes    Nicotine/Tobacco Cessation  He reports that he has been smoking cigarettes. He has a 30 pack-year smoking history. He has never used  "smokeless tobacco.  Nicotine/Tobacco Cessation Plan  Information offered: Patient not interested at this time      BMI  Estimated body mass index is 29.87 kg/m  as calculated from the following:    Height as of this encounter: 1.803 m (5' 11\").    Weight as of this encounter: 97.2 kg (214 lb 3.2 oz).     Counseling  Appropriate preventive services were discussed with this patient, including applicable screening as appropriate for fall prevention, nutrition, physical activity, Tobacco-use cessation, weight loss and cognition.  Checklist reviewing preventive services available has been given to the patient.  Reviewed patient's diet, addressing concerns and/or questions.   The patient was instructed to see the dentist every 6 months.     Karl Pacheco is a 43 year old, presenting for the following:  Physical and Knee Pain        3/14/2024     8:13 AM   Additional Questions   Roomed by PAOLO Andujar  Pain History:  When did you first notice your pain? A week or so ago   Have you seen anyone else for your pain? No  How has your pain affected your ability to work? Not currently working - unrelated to pain  Where in your body do you have pain? Musculoskeletal problem/pain  Onset/Duration: a week or so  Description  Location: knee - right  Joint Swelling: No  Redness: No  Pain: YES - throbbing, burning  Warmth: No  Intensity:  moderate, severe  Progression of Symptoms:  same  Accompanying signs and symptoms:   Fevers: No  Numbness/tingling/weakness: No  History  Trauma to the area: No  Recent illness:  No  Previous similar problem: No  Previous evaluation:  No  Precipitating or alleviating factors:  Aggravating factors include: walking, climbing stairs, lifting, and bending  Therapies tried and outcome: Ibuprofen and Naproxyn        3/14/2024   General Health   How would you rate your overall physical health? Good   Feel stress (tense, anxious, or unable to sleep) Not at all         3/14/2024   Nutrition "   Three or more servings of calcium each day? Yes   Diet: Regular (no restrictions)   How many servings of fruit and vegetables per day? (!) 2-3   How many sweetened beverages each day? (!) 4+         3/14/2024   Exercise   Days per week of moderate/strenous exercise 7 days         3/14/2024   Social Factors   Frequency of gathering with friends or relatives Twice a week   Worry food won't last until get money to buy more Patient declined   Food not last or not have enough money for food? Yes   Do you have housing?  Yes   Are you worried about losing your housing? No   Lack of transportation? No   Unable to get utilities (heat,electricity)? No   (!) FOOD SECURITY CONCERN PRESENT      3/14/2024   Dental   Dentist two times every year? (!) NO         3/14/2024   TB Screening   Were you born outside of [place]?  (!) YES          Today's PHQ-2 Score:       3/14/2024     8:09 AM   PHQ-2 ( 1999 [employer])   Q1: Little interest or pleasure in doing things 0   Q2: Feeling down, depressed or hopeless 0   PHQ-2 Score 0   Q1: Little interest or pleasure in doing things Not at all   Q2: Feeling down, depressed or hopeless Not at all   PHQ-2 Score 0           3/14/2024   Substance Use   Alcohol more than 3/day or more than 7/wk Not Applicable   Do you use any other substances recreationally? (!) CANNABIS PRODUCTS     Social History     Tobacco Use    Smoking status: Every Day     Packs/day: 1.00     Years: 30.00     Additional pack years: 0.00     Total pack years: 30.00     Types: Cigarettes    Smokeless tobacco: Never    Tobacco comments:     Plans to quit in future   Vaping Use    Vaping Use: Never used   Substance Use Topics    Alcohol use: No    Drug use: No             3/14/2024   One time HIV Screening   Previous HIV test? Yes         3/14/2024   STI Screening   New sexual partner(s) since last STI/HIV test? No   ASCVD Risk   The 10-year ASCVD risk score (Burt CATHERINE, et al., 2019) is: 5.7%    Values used to calculate the  "score:      Age: 43 years      Sex: Male      Is Non- : No      Diabetic: No      Tobacco smoker: Yes      Systolic Blood Pressure: 118 mmHg      Is BP treated: No      HDL Cholesterol: 42 mg/dL      Total Cholesterol: 213 mg/dL       Reviewed and updated as needed this visit by Provider   Tobacco  Allergies  Meds  Problems  Med Hx  Surg Hx  Fam Hx          Review of Systems  See HPI      Objective    Exam  /80   Pulse 75   Temp 97  F (36.1  C) (Tympanic)   Resp 20   Ht 1.803 m (5' 11\")   Wt 97.2 kg (214 lb 3.2 oz)   SpO2 99%   BMI 29.87 kg/m     Estimated body mass index is 29.87 kg/m  as calculated from the following:    Height as of this encounter: 1.803 m (5' 11\").    Weight as of this encounter: 97.2 kg (214 lb 3.2 oz).    Physical Exam  GENERAL: alert and no distress  NECK: no adenopathy, no asymmetry, masses, or scars  RESP: lungs clear to auscultation - no rales, rhonchi or wheezes  CV: regular rate and rhythm, normal S1 S2, no S3 or S4, no murmur, click or rub, no peripheral edema  MS: no gross musculoskeletal defects noted, no edema. R knee without effusion, erythema, or warmth. TTP over the lateral joint line. TTP over the distal IT band. Full active ROM. Neg patellar grind.     Signed Electronically by: Johnson Chowdhury PA-C    "

## 2024-03-14 NOTE — PATIENT INSTRUCTIONS
Preventive Care Advice   This is general advice given by our system to help you stay healthy. However, your care team may have specific advice just for you. Please talk to your care team about your preventive care needs.  Nutrition  Eat 5 or more servings of fruits and vegetables each day.  Try wheat bread, brown rice and whole grain pasta (instead of white bread, rice, and pasta).  Get enough calcium and vitamin D. Check the label on foods and aim for 100% of the RDA (recommended daily allowance).  Lifestyle  Exercise at least 150 minutes each week   (30 minutes a day, 5 days a week).  Do muscle strengthening activities 2 days a week. These help control your weight and prevent disease.  No smoking.  Wear sunscreen to prevent skin cancer.  Have a dental exam and cleaning every 6 months.  Yearly exams  See your health care team every year to talk about:  Any changes in your health.  Any medicines your care team has prescribed.  Preventive care, family planning, and ways to prevent chronic diseases.  Shots (vaccines)   HPV shots (up to age 26), if you've never had them before.  Hepatitis B shots (up to age 59), if you've never had them before.  COVID-19 shot: Get this shot when it's due.  Flu shot: Get a flu shot every year.  Tetanus shot: Get a tetanus shot every 10 years.  Pneumococcal, hepatitis A, and RSV shots: Ask your care team if you need these based on your risk.  Shingles shot (for age 50 and up).  General health tests  Diabetes screening:  Starting at age 35, Get screened for diabetes at least every 3 years.  If you are younger than age 35, ask your care team if you should be screened for diabetes.  Cholesterol test: At age 39, start having a cholesterol test every 5 years, or more often if advised.  Bone density scan (DEXA): At age 50, ask your care team if you should have this scan for osteoporosis (brittle bones).  Hepatitis C: Get tested at least once in your life.  STIs (sexually transmitted  infections)  Before age 24: Ask your care team if you should be screened for STIs.  After age 24: Get screened for STIs if you're at risk. You are at risk for STIs (including HIV) if:  You are sexually active with more than one person.  You don't use condoms every time.  You or a partner was diagnosed with a sexually transmitted infection.  If you are at risk for HIV, ask about PrEP medicine to prevent HIV.  Get tested for HIV at least once in your life, whether you are at risk for HIV or not.  Cancer screening tests  Cervical cancer screening: If you have a cervix, begin getting regular cervical cancer screening tests at age 21. Most people who have regular screenings with normal results can stop after age 65. Talk about this with your provider.  Breast cancer scan (mammogram): If you've ever had breasts, begin having regular mammograms starting at age 40. This is a scan to check for breast cancer.  Colon cancer screening: It is important to start screening for colon cancer at age 45.  Have a colonoscopy test every 10 years (or more often if you're at risk) Or, ask your provider about stool tests like a FIT test every year or Cologuard test every 3 years.  To learn more about your testing options, visit: https://www.[employer]/556806.pdf.  For help making a decision, visit: https://bit.ly/kb91449.  Prostate cancer screening test: If you have a prostate and are age 55 to 69, ask your provider if you would benefit from a yearly prostate cancer screening test.  Lung cancer screening: If you are a current or former smoker age 50 to 80, ask your care team if ongoing lung cancer screenings are right for you.  For informational purposes only. Not to replace the advice of your health care provider. Copyright   2023 [facility]. All rights reserved. Clinically reviewed by the [facility] Transitions Program. [employer] 727410 - REV 01/24.    Substance Use Disorder: Care Instructions  Overview     You can  improve your life and health by stopping your use of alcohol or drugs. When you don't drink or use drugs, you may feel and sleep better. You may get along better with your family, friends, and coworkers. There are medicines and programs that can help with substance use disorder.  How can you care for yourself at home?  Here are some ways to help you stay sober and prevent relapse.  If you have been given medicine to help keep you sober or reduce your cravings, be sure to take it exactly as prescribed.  Talk to your doctor about programs that can help you stop using drugs or drinking alcohol.  Do not keep alcohol or drugs in your home.  Plan ahead. Think about what you'll say if other people ask you to drink or use drugs. Try not to spend time with people who drink or use drugs.  Use the time and money spent on drinking or drugs to do something that's important to you.  Preventing a relapse  Have a plan to deal with relapse. Learn to recognize changes in your thinking that lead you to drink or use drugs. Get help before you start to drink or use drugs again.  Try to stay away from situations, friends, or places that may lead you to drink or use drugs.  If you feel the need to drink alcohol or use drugs again, seek help right away. Call a trusted friend or family member. Some people get support from organizations such as Narcotics Anonymous or [employer] or from treatment facilities.  If you relapse, get help as soon as you can. Some people make a plan with another person that outlines what they want that person to do for them if they relapse. The plan usually includes how to handle the relapse and who to notify in case of relapse.  Don't give up. Remember that a relapse doesn't mean that you have failed. Use the experience to learn the triggers that lead you to drink or use drugs. Then quit again. Recovery is a lifelong process. Many people have several relapses before they are able to quit for good.  Follow-up  "care is a fitzpatrick part of your treatment and safety. Be sure to make and go to all appointments, and call your doctor if you are having problems. It's also a good idea to know your test results and keep a list of the medicines you take.  When should you call for help?   Call 911  anytime you think you may need emergency care. For example, call if you or someone else:    Has overdosed or has withdrawal signs. Be sure to tell the emergency workers that you are or someone else is using or trying to quit using drugs. Overdose or withdrawal signs may include:  Losing consciousness.  Seizure.  Seeing or hearing things that aren't there (hallucinations).     Is thinking or talking about suicide or harming others.   Where to get help 24 hours a day, 7 days a week   If you or someone you know talks about suicide, self-harm, a mental health crisis, a substance use crisis, or any other kind of emotional distress, get help right away. You can:    Call the Suicide and Crisis Lifeline at 988.     Call 8-862-304-DXWS (1-956.473.9820).     Text HOME to 380716 to access the Crisis Text Line.   Consider saving these numbers in your phone.  Go to [employer] for more information or to chat online.  Call your doctor now or seek immediate medical care if:    You are having withdrawal symptoms. These may include nausea or vomiting, sweating, shakiness, and anxiety.   Watch closely for changes in your health, and be sure to contact your doctor if:    You have a relapse.     You need more help or support to stop.   Where can you learn more?  Go to https://www.healthRent the Runway.net/patiented  Enter H573 in the search box to learn more about \"Substance Use Disorder: Care Instructions.\"  Current as of: November 15, 2023               Content Version: 14.0    3650-0629 [employer]wise[employer].   Care instructions adapted under license by your healthcare professional. If you have questions about a medical condition or this instruction, always ask " your healthcare professional. Healthwise, [employer] disclaims any warranty or liability for your use of this information.    Use Ibuprofen for your knee. I think this is related to IT band issues or your LDL which is sprained. Follow-up as needed in 1-2 months if still painful.     Medications are refilled.     We will follow-up with your lab results.     Follow-up with me in 1 year.

## 2024-03-17 ENCOUNTER — HOSPITAL ENCOUNTER (EMERGENCY)
Facility: CLINIC | Age: 44
Discharge: HOME | End: 2024-03-17
Attending: EMERGENCY MEDICINE | Admitting: EMERGENCY MEDICINE
Payer: COMMERCIAL

## 2024-03-17 ENCOUNTER — APPOINTMENT (OUTPATIENT)
Dept: GENERAL RADIOLOGY | Facility: CLINIC | Age: 44
End: 2024-03-17
Attending: EMERGENCY MEDICINE
Payer: COMMERCIAL

## 2024-03-17 VITALS
WEIGHT: 214 LBS | RESPIRATION RATE: 16 BRPM | BODY MASS INDEX: 28.99 KG/M2 | DIASTOLIC BLOOD PRESSURE: 97 MMHG | HEART RATE: 71 BPM | TEMPERATURE: 97.9 F | SYSTOLIC BLOOD PRESSURE: 136 MMHG | HEIGHT: 72 IN | OXYGEN SATURATION: 97 %

## 2024-03-17 DIAGNOSIS — S86.911A KNEE STRAIN, RIGHT, INITIAL ENCOUNTER: ICD-10-CM

## 2024-03-17 PROCEDURE — 29505 APPLICATION LONG LEG SPLINT: CPT | Mod: RT | Performed by: EMERGENCY MEDICINE

## 2024-03-17 PROCEDURE — 99284 EMERGENCY DEPT VISIT MOD MDM: CPT | Mod: 25 | Performed by: EMERGENCY MEDICINE

## 2024-03-17 PROCEDURE — 73562 X-RAY EXAM OF KNEE 3: CPT | Mod: RT

## 2024-03-17 PROCEDURE — 99283 EMERGENCY DEPT VISIT LOW MDM: CPT | Performed by: EMERGENCY MEDICINE

## 2024-03-17 RX ORDER — OXYCODONE AND ACETAMINOPHEN 5; 325 MG/1; MG/1
1 TABLET ORAL EVERY 6 HOURS PRN
Qty: 6 TABLET | Refills: 0 | Status: SHIPPED | OUTPATIENT
Start: 2024-03-17 | End: 2024-04-24

## 2024-03-17 ASSESSMENT — COLUMBIA-SUICIDE SEVERITY RATING SCALE - C-SSRS
1. IN THE PAST MONTH, HAVE YOU WISHED YOU WERE DEAD OR WISHED YOU COULD GO TO SLEEP AND NOT WAKE UP?: NO
6. HAVE YOU EVER DONE ANYTHING, STARTED TO DO ANYTHING, OR PREPARED TO DO ANYTHING TO END YOUR LIFE?: NO
2. HAVE YOU ACTUALLY HAD ANY THOUGHTS OF KILLING YOURSELF IN THE PAST MONTH?: NO

## 2024-03-17 ASSESSMENT — ACTIVITIES OF DAILY LIVING (ADL)
ADLS_ACUITY_SCORE: 36
ADLS_ACUITY_SCORE: 36

## 2024-03-17 NOTE — ED TRIAGE NOTES
Pt states his R knee is painful.  He stepped up on a step and heard a Pop and had pain.  This occurred 2 hr ago.  Pt can put weight on his toes but not step down full.  Pt with full ROM, pt is on the lateraspect of his knee.  Pt states PCP has been aware of knee pain in the past.    Pt took 3 naproxin.

## 2024-03-18 NOTE — TELEPHONE ENCOUNTER
DIAGNOSIS: Anselmo Garcia MD  Knee strain, right  Right knee strain difficulty bearing weight     APPOINTMENT DATE: 3.19.24   NOTES STATUS DETAILS   OFFICE NOTE from other specialist Internal 2.15.22  [place]      10.14.14  [facility]   DISCHARGE REPORT from the ER Internal 3.17.24  [facility]   MEDICATION LIST Internal    XRAYS (IMAGES & REPORTS) Internal 3.17.24, 2.15.22  XR Knee Right

## 2024-03-18 NOTE — DISCHARGE INSTRUCTIONS
Return if symptoms worsen or new symptoms develop.  Follow-up with primary care physician next available.  Drink plenty of fluids.  Weight-bear as tolerated use crutches and knee immobilizer needed elevate and ice knee today and then try heat.  Take ibuprofen and Tylenol for pain take Percocet for severe pain at night.  If increased swelling pain numbness weakness or other symptoms present please return for further evaluation and care.

## 2024-03-18 NOTE — ED PROVIDER NOTES
History     Chief Complaint   Patient presents with    Knee Pain     HPI  Junior Rodriguez is a 43 year old male with past medical history significant for previous joint pain pancreatic cyst epididymitis who presents emergency department complaining of right knee pain status post trauma.  Patient stepped up on a step and heard a pop and had significant pain in the knee occurred about 3 hours prior to arrival.  He can put weight on his toes but has difficulty stepping down fully on his foot.  Denies any pain in his hip does not have any ankle or foot pain denies any calf pain has not any other trauma did not fall did not hit his head.    Allergies:  No Known Allergies    Problem List:    Patient Active Problem List    Diagnosis Date Noted    Cannabis abuse, daily use 03/14/2024     Priority: Medium    Raynaud's phenomenon without gangrene 12/07/2022     Priority: Medium    Tobacco use disorder 02/26/2009     Priority: Medium    History of shingles 02/05/2009     Priority: Medium     Overview:   First in highschool.  Repeatedly happening since then.  10 occurances  Occur on right side of face.          Past Medical History:    Past Medical History:   Diagnosis Date    Epididymitis     Multiple joint pain     Pancreatic cyst     Tobacco use        Past Surgical History:    Past Surgical History:   Procedure Laterality Date    DENERVATION OF SPERMATIC CORD MICROSURGICAL Left 4/6/2015    Procedure: DENERVATION OF SPERMATIC CORD MICROSURGICAL;  Surgeon: Kiko Morgan MD;  Location: [facility] OR    EPIDIDYMECTOMY  8/2014    ESOPHAGOSCOPY, GASTROSCOPY, DUODENOSCOPY (EGD), COMBINED N/A 4/19/2022    Procedure: ESOPHAGOGASTRODUODENOSCOPY, WITH FINE NEEDLE ASPIRATION BIOPSY, WITH ENDOSCOPIC ULTRASOUND GUIDANCE;  Surgeon: Guru Kirt Rosenbaum MD;  Location: [facility] GI    VASECTOMY  2/2013       Family History:    Family History   Problem Relation Age of Onset    Neurologic Disorder Mother         MS    Cancer Mother          lung    Chronic Obstructive Pulmonary Disease Maternal Grandmother     Alzheimer Disease Paternal Grandmother     Prostate Cancer No family hx of     Cancer - colorectal No family hx of        Social History:  Marital Status:   [2]  Social History     Tobacco Use    Smoking status: Every Day     Packs/day: 1.00     Years: 30.00     Additional pack years: 0.00     Total pack years: 30.00     Types: Cigarettes    Smokeless tobacco: Never    Tobacco comments:     Plans to quit in future   Vaping Use    Vaping Use: Never used   Substance Use Topics    Alcohol use: No    Drug use: No        Medications:    amLODIPine (NORVASC) 5 MG tablet  ibuprofen (ADVIL/MOTRIN) 800 MG tablet  ketoconazole (NIZORAL) 2 % external cream  tacrolimus (PROTOPIC) 0.1 % external ointment          Review of Systems  As per HPI.  Physical Exam   BP: 123/82  Pulse: 74  Temp: 97.9  F (36.6  C)  Resp: 16  Height: 182.9 cm (6')  Weight: 97.1 kg (214 lb)  SpO2: 98 %      Physical Exam  Vitals and nursing note reviewed.   Constitutional:       General: He is not in acute distress.     Appearance: Normal appearance. He is not ill-appearing, toxic-appearing or diaphoretic.   HENT:      Head: Normocephalic and atraumatic.      Nose: Nose normal.      Mouth/Throat:      Mouth: Mucous membranes are moist.   Eyes:      Conjunctiva/sclera: Conjunctivae normal.   Cardiovascular:      Pulses: Normal pulses.   Pulmonary:      Effort: Pulmonary effort is normal.   Musculoskeletal:      Cervical back: Normal range of motion.      Comments: There is no midline back tenderness.  There is tenderness palpation lateral aspect of the right knee.  Mild swelling noted in this region no erythema present.  There is no laxity in the knee joint but has pain with medial lateral movements of the knee.  No posterior drawer sign.  No lower calf pain swelling or erythema present patient able to flex and extend knee without difficulty but does have mild pain with this.   No ankle tenderness good plantarflexion dorsiflexion ankle pulses sensation symmetrical.   Skin:     General: Skin is warm and dry.      Findings: No rash.   Neurological:      General: No focal deficit present.      Mental Status: He is alert and oriented to person, place, and time.      Sensory: No sensory deficit.      Motor: No weakness.      Coordination: Coordination normal.   Psychiatric:         Mood and Affect: Mood normal.         ED Course        Procedures              Critical Care time:  none               Results for orders placed or performed during the hospital encounter of 03/17/24 (from the past 24 hour(s))   XR Knee Right 3 Views    Narrative    EXAM: XR KNEE RIGHT 3 VIEWS  LOCATION: [facility]  DATE: 3/17/2024    INDICATION: R knee injury; pain  COMPARISON: None.      Impression    IMPRESSION: No acute fracture or malalignment. No significant degenerative changes. No knee joint effusion.       Medications - No data to display    Assessments & Plan (with Medical Decision Making) records reviewed including past medical history medications and allergies.  X-ray of the right knee was obtained.  I independently reviewed this and it reveals no acute fracture or malalignment.  There is no significant degenerative changes no knee joint effusion is present.  Patient is good to be placed in a knee immobilizer due to difficulty walking and use crutches as needed and going to give him a few pain pills and he should follow-up with orthopedics next available.  Patient is agreed this plan.     I have reviewed the nursing notes.    I have reviewed the findings, diagnosis, plan and need for follow up with the patient.           Discharge Medication List as of 3/17/2024  8:53 PM        START taking these medications    Details   oxyCODONE-acetaminophen (PERCOCET) 5-325 MG tablet Take 1 tablet by mouth every 6 hours as needed for severe pain, Disp-6 tablet, R-0, [facility]              Final diagnoses:   Knee strain, right, initial encounter       3/17/2024   [facility] EMERGENCY DEPT       Anselmo Garcia MD  03/18/24 [id number]

## 2024-03-19 ENCOUNTER — PRE VISIT (OUTPATIENT)
Dept: ORTHOPEDICS | Facility: CLINIC | Age: 44
End: 2024-03-19

## 2024-03-19 ENCOUNTER — OFFICE VISIT (OUTPATIENT)
Dept: ORTHOPEDICS | Facility: CLINIC | Age: 44
End: 2024-03-19
Attending: EMERGENCY MEDICINE
Payer: COMMERCIAL

## 2024-03-19 DIAGNOSIS — M25.561 ACUTE PAIN OF RIGHT KNEE: Primary | ICD-10-CM

## 2024-03-19 DIAGNOSIS — S86.911A KNEE STRAIN, RIGHT, INITIAL ENCOUNTER: ICD-10-CM

## 2024-03-19 PROCEDURE — 99214 OFFICE O/P EST MOD 30 MIN: CPT | Performed by: FAMILY MEDICINE

## 2024-03-19 NOTE — PROGRESS NOTES
Sports Medicine Clinic Visit    PCP: Johnson Chowdhury    CHIEF COMPLAINT:  Pain of the Right Knee       HISTORY OF PRESENT ILLNESS  Mr. Rodriguez is a 43 year old male who is seen  in consultation at the request of Dr. Garcia presenting with right knee pain. The patient was recently seen in the ED on 3/17/24 for right knee pain. He stepped up on a step and heard a pop and significant pain earlier that day. They obtained XR and was referred here for further management. The pain is located over the lateral aspect of the knee. Reports swelling. He does have instability, but denies any locking or catching. He is using a cane to assist in ambulating.         Additional history: as documented    MEDICAL HISTORY  Patient Active Problem List   Diagnosis    History of shingles    Tobacco use disorder    Raynaud's phenomenon without gangrene    Cannabis abuse, daily use       Current Outpatient Medications   Medication Sig Dispense Refill    amLODIPine (NORVASC) 5 MG tablet Take 1 tablet (5 mg) by mouth daily 90 tablet 3    ibuprofen (ADVIL/MOTRIN) 800 MG tablet Take 1 tablet (800 mg) by mouth every 8 hours as needed for moderate pain 90 tablet 3    oxyCODONE-acetaminophen (PERCOCET) 5-325 MG tablet Take 1 tablet by mouth every 6 hours as needed for severe pain 6 tablet 0    ketoconazole (NIZORAL) 2 % external cream Apply daily to face. (Patient not taking: Reported on 3/14/2024) 60 g 5    tacrolimus (PROTOPIC) 0.1 % external ointment Apply sparingly twice daily if rash is inflamed on face. (Patient not taking: Reported on 3/14/2024) 30 g 6       No Known Allergies    Family History   Problem Relation Age of Onset    Neurologic Disorder Mother         MS    Cancer Mother         lung    Chronic Obstructive Pulmonary Disease Maternal Grandmother     Alzheimer Disease Paternal Grandmother     Prostate Cancer No family hx of     Cancer - colorectal No family hx of        Additional medical/Social/Surgical histories reviewed in EPIC  and updated as appropriate.        PHYSICAL EXAM  General  - normal appearance, in no obvious distress  Musculoskeletal - right knee  - stance: Markedly antalgic gait, assisted by crutches  - inspection: 1+ effusion  - palpation: Lateral joint line tenderness  - ROM: 120degrees flexion, pain at terminal extension passively  - strength: 5/5 in flexion, 5/5 in extension  - special tests:  (-) Lachman  (+) Russell  (+) Thessaly  (-) varus at 0 and 30 degrees flexion  (-) valgus at 0 and 30 degrees flexion  Neuro  - no sensory or motor deficit, grossly normal coordination, normal muscle tone             ASSESSMENT & PLAN  Mr. Rodriguez is a 43 year old male who presents to clinic today with right knee pain.    I reviewed his x-ray in the room with him, this shows no obvious acute issues.    His pain is severe and he is unable to bear weight without significant pain.  We did give him a knee brace, I am also refilling his pain medication on a one-time basis.  Lastly, I am ordering an MRI.  We did give him some rehab exercises to perform in the interim as well.    I will get in touch with him with his MRI results.    It was a pleasure seeing Junior today.    Anselmo Farias DO, [facility]  Primary Care Sports Medicine      This note was constructed using Dragon dictation software, please excuse any minor errors in spelling, grammar, or syntax.

## 2024-03-19 NOTE — LETTER
3/19/2024      RE: Junior Rodriguez  [address]  [place] [address]     Dear Colleague,    Thank you for referring your patient, Junior Rodriguez, to the [facility] [place]. Please see a copy of my visit note below.    Sports Medicine Clinic Visit    PCP: Johnson Chowdhury    CHIEF COMPLAINT:  Pain of the Right Knee       HISTORY OF PRESENT ILLNESS  Mr. Rodriguez is a 43 year old male who is seen  in consultation at the request of Dr. Garcia presenting with right knee pain. The patient was recently seen in the ED on 3/17/24 for right knee pain. He stepped up on a step and heard a pop and significant pain earlier that day. They obtained XR and was referred here for further management. The pain is located over the lateral aspect of the knee. Reports swelling. He does have instability, but denies any locking or catching. He is using a cane to assist in ambulating.         Additional history: as documented    MEDICAL HISTORY  Patient Active Problem List   Diagnosis     History of shingles     Tobacco use disorder     Raynaud's phenomenon without gangrene     Cannabis abuse, daily use       Current Outpatient Medications   Medication Sig Dispense Refill     amLODIPine (NORVASC) 5 MG tablet Take 1 tablet (5 mg) by mouth daily 90 tablet 3     ibuprofen (ADVIL/MOTRIN) 800 MG tablet Take 1 tablet (800 mg) by mouth every 8 hours as needed for moderate pain 90 tablet 3     oxyCODONE-acetaminophen (PERCOCET) 5-325 MG tablet Take 1 tablet by mouth every 6 hours as needed for severe pain 6 tablet 0     ketoconazole (NIZORAL) 2 % external cream Apply daily to face. (Patient not taking: Reported on 3/14/2024) 60 g 5     tacrolimus (PROTOPIC) 0.1 % external ointment Apply sparingly twice daily if rash is inflamed on face. (Patient not taking: Reported on 3/14/2024) 30 g 6       No Known Allergies    Family History   Problem Relation Age of Onset     Neurologic Disorder Mother         MS      Cancer Mother         lung     Chronic Obstructive Pulmonary Disease Maternal Grandmother      Alzheimer Disease Paternal Grandmother      Prostate Cancer No family hx of      Cancer - colorectal No family hx of        Additional medical/Social/Surgical histories reviewed in EPIC and updated as appropriate.        PHYSICAL EXAM  General  - normal appearance, in no obvious distress  Musculoskeletal - right knee  - stance: Markedly antalgic gait, assisted by crutches  - inspection: 1+ effusion  - palpation: Lateral joint line tenderness  - ROM: 120degrees flexion, pain at terminal extension passively  - strength: 5/5 in flexion, 5/5 in extension  - special tests:  (-) Lachman  (+) Russell  (+) Thessaly  (-) varus at 0 and 30 degrees flexion  (-) valgus at 0 and 30 degrees flexion  Neuro  - no sensory or motor deficit, grossly normal coordination, normal muscle tone             ASSESSMENT & PLAN  Mr. Rodriguez is a 43 year old male who presents to clinic today with right knee pain.    I reviewed his x-ray in the room with him, this shows no obvious acute issues.    His pain is severe and he is unable to bear weight without significant pain.  We did give him a knee brace, I am also refilling his pain medication on a one-time basis.  Lastly, I am ordering an MRI.  We did give him some rehab exercises to perform in the interim as well.    I will get in touch with him with his MRI results.    It was a pleasure seeing Junior today.    Anselmo Farias DO, [facility]  Primary Care Sports Medicine      This note was constructed using Dragon dictation software, please excuse any minor errors in spelling, grammar, or syntax.

## 2024-03-19 NOTE — PATIENT INSTRUCTIONS
Please call [phone] to schedule your MRI  Once your MRI is scheduled, call [phone] to schedule a telephone Follow-up with Dr. Anselmo Farias to review your MRI results

## 2024-03-20 RX ORDER — HYDROCODONE BITARTRATE AND ACETAMINOPHEN 5; 325 MG/1; MG/1
1 TABLET ORAL EVERY 6 HOURS PRN
Qty: 10 TABLET | Refills: 0 | Status: SHIPPED | OUTPATIENT
Start: 2024-03-20 | End: 2024-03-25

## 2024-03-23 ENCOUNTER — TELEPHONE (OUTPATIENT)
Dept: ORTHOPEDICS | Facility: CLINIC | Age: 44
End: 2024-03-23
Payer: COMMERCIAL

## 2024-03-23 NOTE — TELEPHONE ENCOUNTER
Patient Returning Call    Reason for call:  pt needing medication to be switched to different location requesting callback     [facility] pharmacy   [address] [place] [id number]      Information relayed to patient:  te sent to clinic     Patient has additional questions:  No      Okay to leave a detailed message?: Yes at Cell number on file:    Telephone Information:   Mobile [phone]

## 2024-03-25 RX ORDER — HYDROCODONE BITARTRATE AND ACETAMINOPHEN 5; 325 MG/1; MG/1
1 TABLET ORAL EVERY 6 HOURS PRN
Qty: 10 TABLET | Refills: 0 | Status: SHIPPED | OUTPATIENT
Start: 2024-03-25 | End: 2024-04-24

## 2024-04-02 ENCOUNTER — HOSPITAL ENCOUNTER (OUTPATIENT)
Dept: MRI IMAGING | Facility: CLINIC | Age: 44
Discharge: HOME | End: 2024-04-02
Attending: FAMILY MEDICINE | Admitting: FAMILY MEDICINE
Payer: COMMERCIAL

## 2024-04-02 DIAGNOSIS — M25.561 ACUTE PAIN OF RIGHT KNEE: ICD-10-CM

## 2024-04-02 PROCEDURE — 73721 MRI JNT OF LWR EXTRE W/O DYE: CPT | Mod: RT

## 2024-04-02 PROCEDURE — 73721 MRI JNT OF LWR EXTRE W/O DYE: CPT | Mod: 26 | Performed by: RADIOLOGY

## 2024-04-03 ENCOUNTER — TELEPHONE (OUTPATIENT)
Dept: ORTHOPEDICS | Facility: CLINIC | Age: 44
End: 2024-04-03
Payer: COMMERCIAL

## 2024-04-03 DIAGNOSIS — S83.241A ACUTE MEDIAL MENISCUS TEAR OF RIGHT KNEE, INITIAL ENCOUNTER: Primary | ICD-10-CM

## 2024-04-03 NOTE — TELEPHONE ENCOUNTER
Per Dr. Farias, I called the patient to review his MRI results. Discussed his large medial meniscus tear and recommendation of seeing Dr. Baker to get his surgical opinion. IN addition, I let him know that he should also begin physical therapy regardless if surgery is indicated or not. I provided him the phone number to schedule physical therapy and also assisted in scheduling a consult appointment with Dr. Baker on 4/15/24 at the [facility]. He also did want me to note that he did  the hydrocodrone from the pharmacy that Dr. Farias had sent, but he did not take this as it makes him itchy. He states that she disposed of the medication properly and is not requesting new medication as he feels that he does not need narcotic medication at this time. We discussed that if he did need pain medication, he can take Tylenol or ibuprofen. All questions were answered at this time. Naheed Huerta ATC on 4/3/2024 at 11:48 AM

## 2024-04-03 NOTE — TELEPHONE ENCOUNTER
----- Message from Anselmo Farias DO sent at 4/3/2024 10:07 AM CDT -----  Junior Jon has a pretty significant meniscus tear.  Totally makes sense with his injury.  It might not be a bad idea to get him in front of one of our surgeons, although I do know that they would want him to start PT regardless.  Can you pass this on to him and help get him into PT and to see Mac?    Thank you!    Paul  ----- Message -----  From: Prosper Roth  Sent: 4/3/2024   7:20 AM CDT  To: Anselmo Farias DO

## 2024-04-10 NOTE — TELEPHONE ENCOUNTER
DIAGNOSIS: Right knee medial meniscus tear    APPOINTMENT DATE: 4/15/2024    Records Requested     April 10, 2024 4:12 PM  [id number]   Facility  [facility]    Outcome I called [facility] Ph: [phone]       NOTES STATUS DETAILS   OFFICE NOTE from referring provider Internal Anselmo Garcia MD    DISCHARGE REPORT from the ER Internal 3/17/2024 - Knee Strain    MEDICATION LIST Internal    LABS     MRI Internal MRI Knee Right 4/2/2024    XRAYS (IMAGES & REPORTS) Care Everywhere- Requested from [facility]  Xray Knee Right 3/17/2024, 2/15/2022,     Requested- [facility]   Xray Knee Right 10/22/2014      Action 04/11/2024 cdk   Action Taken Imaging from [facility] resolved.  ck

## 2024-04-15 ENCOUNTER — PRE VISIT (OUTPATIENT)
Dept: ORTHOPEDICS | Facility: CLINIC | Age: 44
End: 2024-04-15

## 2024-04-15 ENCOUNTER — OFFICE VISIT (OUTPATIENT)
Dept: ORTHOPEDICS | Facility: CLINIC | Age: 44
End: 2024-04-15
Payer: COMMERCIAL

## 2024-04-15 ENCOUNTER — TELEPHONE (OUTPATIENT)
Dept: ORTHOPEDICS | Facility: CLINIC | Age: 44
End: 2024-04-15

## 2024-04-15 VITALS — BODY MASS INDEX: 28.99 KG/M2 | HEIGHT: 72 IN | WEIGHT: 214 LBS

## 2024-04-15 DIAGNOSIS — M23.203 OLD PERIPHERAL TEAR OF MEDIAL MENISCUS OF RIGHT KNEE: Primary | ICD-10-CM

## 2024-04-15 PROCEDURE — 99204 OFFICE O/P NEW MOD 45 MIN: CPT | Performed by: ORTHOPAEDIC SURGERY

## 2024-04-15 NOTE — LETTER
4/15/2024         RE: Junior Rodriguez  [address]  [facility] [address]        Dear Colleague,    Thank you for referring your patient, Junior Rodriguez, to the [facility] [place]. Please see a copy of my visit note below.    CHIEF CONCERN: Right knee medial meniscus root tear    HISTORY:   Very pleasant 43-year-old male.  Referred to my clinic for evaluation of a medial meniscus root tear.  He had a specific event.  Felt a pop.  Increased onset of pain.  Really difficulty bearing weight and doing the things that he wants to do.Has not had problems with his knee in the past.  He saw primary care sports medicine who adeptly obtained an MRI.  This showed a complete rupture of the posterior horn of the medial meniscus root.  He was referred to my clinic for definitive management.      PAST MEDICAL HISTORY: (Reviewed with the patient and in the [facility] medical record)  None    PAST SURGICAL HISTORY: (Reviewed with the patient and in the [facility] medical record)  No knee surgery    MEDICATIONS: (Reviewed with the patient and in the [facility] medical record)    Notable medications include: No blood thinners or opioids    ALLERGIES: (Reviewed with the patient and in the [facility] medical record)  None      SOCIAL HISTORY: (Reviewed with the patient and in the medical record)  --Tobacco: Epic reports tobacco use as of March 14, 2024  --Occupation: Does concrete work  --Avocation/Sport: Wants to be more active than he is spending time with his kids    FAMILY HISTORY: (Reviewed with the patient and in the medical record)  -- No family history of bleeding, clotting, or difficulty with anesthesia    REVIEW OF SYSTEMS: (Reviewed with the patient and on the health intake form)  -- A comprehensive 10 point review of systems was conducted and is negative except as noted in the HPI    EXAM:     General: Awake, Alert and Oriented, No acute Distress. Articulate and Interactive    Body mass index is 29.02  kg/m .    Right lower extremity :  Skin is Warm and Well perfused, no suggestion of infection  Stable to varus valgus stress testing  Stable anterior posterior drawer testing  No pivot shift  Lachman 0  Positive medial joint line tenderness, positive Russell's  EHL/FHL/TA/GS 5/5  Sensation intact L3-S1  2+ Dorsalis Pedis Pulse    IMAGING:    Radiographs of the right knee from 3/17/2024 were independently reviewed by me and findings were discussed with the patient today. The imaging demonstrates no fractures dislocations well-preserved joint space no significant arthritis.    MRI of the right knee from 4/2/2024 were independently reviewed by me and findings were discussed with the patient today. The imaging demonstrates complete rupture of the medial meniscus root.  Intact ACL and PCL, intact collateral ligaments.  Intact articular cartilage.  No significant chondrosis.  Positive ghost meniscus sign.    ASSESSMENT:  Complete rupture of the medial meniscus root right knee.  Preserved articular cartilage    PLAN:  I discussed with the patient the risks, benefits, complications and techniques of surgery as well as the natural history of medial meniscus root tears and the alternative treatment options.    The risks include, but are not limited to the risk of death and risk of a myocardial infarction, risk of bleeding and a risk of infection, risk of nerve damage and a risk of muscle damage, stiffness, instability, continued pain or worsening pain and retear of the meniscus root, subsequent arthritis, failure to improve, inability return to desired activities    Surgical plan will be examination under anesthesia, knee arthroscopy, meniscus root repair..    The patient was provided an opportunity to ask questions and these were answered.        Again, thank you for allowing me to participate in the care of your patient.        Sincerely,        Andres Baker MD

## 2024-04-15 NOTE — TELEPHONE ENCOUNTER
Procedure: meniscus root repair  Facility: [facility]  Length: 90 minutes  Anesthesia: Choice  Post-op appointments needed: 2 weeks provider only, 6 weeks with provider only.  Surgery packet/instructions given to patient?  Yes     Pre-Operative Teaching Flowsheet     Person(s) involved in teaching: Patient     Motivation Level:  Receptive (willing/able to accept information) and asks appropriate questions where applicable: Yes  Any cultural factors/[employer] beliefs that may influence understanding or compliance? No     Patient demonstrates understanding of the following:  Pre-operative planning, including the necessary appointments and preparation needed prior to surgery: Yes  Which situations necessitate calling provider and whom to contact: Yes  Pain management techniques pre and post op: Yes  How, and when, to access community resources: Yes    Who will drive and stay/ with patient after surgery: Wife Anuja  Pre-op exam   PT to be completed at          Additional Teaching Concerns Addressed:   Post-operative living arrangements and necessary adaptations to living environment.     Instructional Materials Used/Given: Yes, pre-op packet given including forms for Your surgery day, preparing for surgery, showering before surgery, Stop light tool introduced, Opioid pain medication guideline, pre-op physical form, and map  Patient expressed understanding of all forms given, questions were answered and will review in more detail at home.     Time spent with patient: 20 minutes.

## 2024-04-16 NOTE — PROGRESS NOTES
CHIEF CONCERN: Right knee medial meniscus root tear    HISTORY:   Very pleasant 43-year-old male.  Referred to my clinic for evaluation of a medial meniscus root tear.  He had a specific event.  Felt a pop.  Increased onset of pain.  Really difficulty bearing weight and doing the things that he wants to do.Has not had problems with his knee in the past.  He saw primary care sports medicine who adeptly obtained an MRI.  This showed a complete rupture of the posterior horn of the medial meniscus root.  He was referred to my clinic for definitive management.      PAST MEDICAL HISTORY: (Reviewed with the patient and in the [facility] medical record)  None    PAST SURGICAL HISTORY: (Reviewed with the patient and in the [facility] medical record)  No knee surgery    MEDICATIONS: (Reviewed with the patient and in the [facility] medical record)    Notable medications include: No blood thinners or opioids    ALLERGIES: (Reviewed with the patient and in the [facility] medical record)  None      SOCIAL HISTORY: (Reviewed with the patient and in the medical record)  --Tobacco: Epic reports tobacco use as of March 14, 2024  --Occupation: Does concrete work  --Avocation/Sport: Wants to be more active than he is spending time with his kids    FAMILY HISTORY: (Reviewed with the patient and in the medical record)  -- No family history of bleeding, clotting, or difficulty with anesthesia    REVIEW OF SYSTEMS: (Reviewed with the patient and on the health intake form)  -- A comprehensive 10 point review of systems was conducted and is negative except as noted in the HPI    EXAM:     General: Awake, Alert and Oriented, No acute Distress. Articulate and Interactive    Body mass index is 29.02 kg/m .    Right lower extremity :  Skin is Warm and Well perfused, no suggestion of infection  Stable to varus valgus stress testing  Stable anterior posterior drawer testing  No pivot shift  Lachman 0  Positive medial joint line tenderness, positive  Russell's  EHL/FHL/TA/GS 5/5  Sensation intact L3-S1  2+ Dorsalis Pedis Pulse    IMAGING:    Radiographs of the right knee from 3/17/2024 were independently reviewed by me and findings were discussed with the patient today. The imaging demonstrates no fractures dislocations well-preserved joint space no significant arthritis.    MRI of the right knee from 4/2/2024 were independently reviewed by me and findings were discussed with the patient today. The imaging demonstrates complete rupture of the medial meniscus root.  Intact ACL and PCL, intact collateral ligaments.  Intact articular cartilage.  No significant chondrosis.  Positive ghost meniscus sign.    ASSESSMENT:  Complete rupture of the medial meniscus root right knee.  Preserved articular cartilage    PLAN:  I discussed with the patient the risks, benefits, complications and techniques of surgery as well as the natural history of medial meniscus root tears and the alternative treatment options.    The risks include, but are not limited to the risk of death and risk of a myocardial infarction, risk of bleeding and a risk of infection, risk of nerve damage and a risk of muscle damage, stiffness, instability, continued pain or worsening pain and retear of the meniscus root, subsequent arthritis, failure to improve, inability return to desired activities    Surgical plan will be examination under anesthesia, knee arthroscopy, meniscus root repair..    The patient was provided an opportunity to ask questions and these were answered.

## 2024-04-17 PROBLEM — M23.203 OLD PERIPHERAL TEAR OF MEDIAL MENISCUS OF RIGHT KNEE: Status: ACTIVE | Noted: 2024-04-15

## 2024-04-17 NOTE — TELEPHONE ENCOUNTER
Patient is scheduled for surgery with Dr. Baker    Spoke with: Patient    Date of Surgery: 4/26/24    Location: [facility]    Post ops: 1 & 6 weeks    Pre op with Provider: Complete    H&P: Scheduled with PCP 4/24/24    Additional imaging/appointments: N/A    Surgery packet: Received in clinic     Additional comments: N/A        Berta Harris MA on 4/17/2024 at 9:23 AM

## 2024-04-17 NOTE — TELEPHONE ENCOUNTER
FYI - Status Update    Who is Calling: patient    Update: pt is waiting for RN to call him back regarding knee surgery no callback has been placed as of yet, requesting status update     Does caller want a call/response back: Yes     Okay to leave a detailed message?: Yes at Cell number on file:    Telephone Information:   Mobile [phone]

## 2024-04-22 ENCOUNTER — TELEPHONE (OUTPATIENT)
Dept: ORTHOPEDICS | Facility: CLINIC | Age: 44
End: 2024-04-22

## 2024-04-22 NOTE — TELEPHONE ENCOUNTER
Patient is returning a call from the clinic.  He has surgery on Friday and just missed the call.  Please contact him to discuss.     He is available and the phone is on.  [phone].  Ok to leave a detailed message.      Thanks.

## 2024-04-24 ENCOUNTER — OFFICE VISIT (OUTPATIENT)
Dept: FAMILY MEDICINE | Facility: CLINIC | Age: 44
End: 2024-04-24
Payer: COMMERCIAL

## 2024-04-24 VITALS
HEIGHT: 72 IN | WEIGHT: 210.2 LBS | OXYGEN SATURATION: 99 % | HEART RATE: 71 BPM | DIASTOLIC BLOOD PRESSURE: 80 MMHG | BODY MASS INDEX: 28.47 KG/M2 | RESPIRATION RATE: 18 BRPM | SYSTOLIC BLOOD PRESSURE: 104 MMHG

## 2024-04-24 DIAGNOSIS — F17.200 TOBACCO USE DISORDER: ICD-10-CM

## 2024-04-24 DIAGNOSIS — Z01.818 PREOP GENERAL PHYSICAL EXAM: Primary | ICD-10-CM

## 2024-04-24 DIAGNOSIS — M23.203 OLD PERIPHERAL TEAR OF MEDIAL MENISCUS OF RIGHT KNEE: ICD-10-CM

## 2024-04-24 DIAGNOSIS — I73.00 RAYNAUD'S PHENOMENON WITHOUT GANGRENE: ICD-10-CM

## 2024-04-24 PROCEDURE — 99214 OFFICE O/P EST MOD 30 MIN: CPT | Performed by: PHYSICIAN ASSISTANT

## 2024-04-24 ASSESSMENT — PAIN SCALES - GENERAL: PAINLEVEL: NO PAIN (1)

## 2024-04-24 NOTE — PROGRESS NOTES
Preoperative Evaluation  [facility]  [address] [place] [id number]  Phone: [phone]  Fax: [phone]  Primary Provider: Marjorie Adam  Pre-op Performing Provider: MARJORIE ADAM  Apr 24, 2024     Junior is a 43 year old, presenting for the following:  Pre-Op Exam        4/24/2024     7:48 AM   Additional Questions   Roomed by Angelika BILLINGSLEY CMA   Accompanied by Self     Surgical Information  Surgery/Procedure: right knee examination under anesthesia, knee arthroscopy, meniscus root repair   Surgery Location: [facility]   Surgeon: Andres Baker MD   Surgery Date: 04/26/2024  Time of Surgery: 10:40am   Where patient plans to recover: At home with family  Fax number for surgical facility: Note does not need to be faxed, will be available electronically in Epic.    Assessment & Plan     The proposed surgical procedure is considered LOW risk.    Problem List Items Addressed This Visit          Circulatory    Raynaud's phenomenon without gangrene       Musculoskeletal and Integumentary    Old peripheral tear of medial meniscus of right knee       Behavioral    Tobacco use disorder     Other Visit Diagnoses       Preop general physical exam    -  Primary           - No identified additional risk factors other than previously addressed    Antiplatelet or Anticoagulation Medication Instructions   - Patient is on no antiplatelet or anticoagulation medications.    Additional Medication Instructions   - ibuprofen (Advil, Motrin): HOLD 1 day before surgery.     Recommendation  APPROVAL GIVEN to proceed with proposed procedure, without further diagnostic evaluation.    Subjective   HPI related to upcoming procedure: The patient is a 43-year-old male with history of Raynaud's phenomenon, tobacco abuse and daily cannabis use who presents for preoperative evaluation for a proposed right knee arthroscopy and meniscus root  repair.  He is in his usual state of health and denies any recent fevers or chills, URI symptoms, chest pain or short of breath, abdominal pain, nausea or vomiting, diarrhea constipation or any other concerning systemic sign or symptom.        4/24/2024     7:34 AM   Preop Questions   1. Have you ever had a heart attack or stroke? No   2. Have you ever had surgery on your heart or blood vessels, such as a stent placement, a coronary artery bypass, or surgery on an artery in your head, neck, heart, or legs? No   3. Do you have chest pain with activity? No   4. Do you have a history of  heart failure? No   5. Do you currently have a cold, bronchitis or symptoms of other infection? No   6. Do you have a cough, shortness of breath, or wheezing? No   7. Do you or anyone in your family have previous history of blood clots? No   8. Do you or does anyone in your family have a serious bleeding problem such as prolonged bleeding following surgeries or cuts? No   9. Have you ever had problems with anemia or been told to take iron pills? No   10. Have you had any abnormal blood loss such as black, tarry or bloody stools? No   11. Have you ever had a blood transfusion? No   12. Are you willing to have a blood transfusion if it is medically needed before, during, or after your surgery? Yes   13. Have you or any of your relatives ever had problems with anesthesia? No   14. Do you have sleep apnea, excessive snoring or daytime drowsiness? No   15. Do you have any artifical heart valves or other implanted medical devices like a pacemaker, defibrillator, or continuous glucose monitor? No   16. Do you have artificial joints? No   17. Are you allergic to latex? No       Health Care Directive  Patient does not have a Health Care Directive or Living Will: Discussed advance care planning with patient; information given to patient to review.    Preoperative Review of    reviewed - controlled substances reflected in medication  list.      Status of Chronic Conditions:  See problem list for active medical problems.  Problems all longstanding and stable, except as noted/documented.  See ROS for pertinent symptoms related to these conditions.    Patient Active Problem List    Diagnosis Date Noted    Old peripheral tear of medial meniscus of right knee 04/15/2024     Priority: Medium    Cannabis abuse, daily use 03/14/2024     Priority: Medium    Raynaud's phenomenon without gangrene 12/07/2022     Priority: Medium    Tobacco use disorder 02/26/2009     Priority: Medium    History of shingles 02/05/2009     Priority: Medium     Overview:   First in highschool.  Repeatedly happening since then.  10 occurances  Occur on right side of face.        Past Medical History:   Diagnosis Date    Epididymitis     Multiple joint pain     Pancreatic cyst     Tobacco use      Past Surgical History:   Procedure Laterality Date    DENERVATION OF SPERMATIC CORD MICROSURGICAL Left 4/6/2015    Procedure: DENERVATION OF SPERMATIC CORD MICROSURGICAL;  Surgeon: Kiko Morgan MD;  Location: [facility]    EPIDIDYMECTOMY  8/2014    ESOPHAGOSCOPY, GASTROSCOPY, DUODENOSCOPY (EGD), COMBINED N/A 4/19/2022    Procedure: ESOPHAGOGASTRODUODENOSCOPY, WITH FINE NEEDLE ASPIRATION BIOPSY, WITH ENDOSCOPIC ULTRASOUND GUIDANCE;  Surgeon: Guru Kirt Rosenbaum MD;  Location: [facility]    VASECTOMY  2/2013     Current Outpatient Medications   Medication Sig Dispense Refill    amLODIPine (NORVASC) 5 MG tablet Take 1 tablet (5 mg) by mouth daily 90 tablet 3    ibuprofen (ADVIL/MOTRIN) 800 MG tablet Take 1 tablet (800 mg) by mouth every 8 hours as needed for moderate pain 90 tablet 3    HYDROcodone-acetaminophen (NORCO) 5-325 MG tablet Take 1 tablet by mouth every 6 hours as needed for severe pain (Patient not taking: Reported on 4/24/2024) 10 tablet 0    oxyCODONE-acetaminophen (PERCOCET) 5-325 MG tablet Take 1 tablet by mouth every 6 hours as needed for severe pain  (Patient not taking: Reported on 4/24/2024) 6 tablet 0       No Known Allergies     Social History     Tobacco Use    Smoking status: Every Day     Current packs/day: 1.00     Average packs/day: 1 pack/day for 30.0 years (30.0 ttl pk-yrs)     Types: Cigarettes    Smokeless tobacco: Never    Tobacco comments:     Plans to quit in future   Substance Use Topics    Alcohol use: No     History   Drug Use No         Review of Systems    Review of Systems  CONSTITUTIONAL: NEGATIVE for fever, chills, change in weight  INTEGUMENTARY/SKIN: NEGATIVE for worrisome rashes, moles or lesions  EYES: NEGATIVE for vision changes or irritation  ENT/MOUTH: NEGATIVE for ear, mouth and throat problems  RESP: NEGATIVE for significant cough or SOB  BREAST: NEGATIVE for masses, tenderness or discharge  CV: NEGATIVE for chest pain, palpitations or peripheral edema  GI: NEGATIVE for nausea, abdominal pain, heartburn, or change in bowel habits  : NEGATIVE for frequency, dysuria, or hematuria  MUSCULOSKELETAL: NEGATIVE for significant arthralgias or myalgia  NEURO: NEGATIVE for weakness, dizziness or paresthesias  ENDOCRINE: NEGATIVE for temperature intolerance, skin/hair changes  HEME: NEGATIVE for bleeding problems  PSYCHIATRIC: NEGATIVE for changes in mood or affect    Objective    /80 (BP Location: Right arm, Patient Position: Sitting, Cuff Size: Adult Large)   Pulse 71   Resp 18   Ht 1.829 m (6')   Wt 95.3 kg (210 lb 3.2 oz)   SpO2 99%   BMI 28.51 kg/m     Estimated body mass index is 28.51 kg/m  as calculated from the following:    Height as of this encounter: 1.829 m (6').    Weight as of this encounter: 95.3 kg (210 lb 3.2 oz).  Physical Exam  GENERAL: alert and no distress  EYES: Eyes grossly normal to inspection, PERRL and conjunctivae and sclerae normal  HENT: ear canals and TM's normal, nose and mouth without ulcers or lesions  NECK: no adenopathy, no asymmetry, masses, or scars  RESP: lungs clear to auscultation - no  rales, rhonchi or wheezes  CV: regular rate and rhythm, normal S1 S2, no S3 or S4, no murmur, click or rub, no peripheral edema  ABDOMEN: soft, nontender, no hepatosplenomegaly, no masses and bowel sounds normal  MS: no gross musculoskeletal defects noted, no edema    Recent Labs   Lab Test 03/14/24  0847      POTASSIUM 4.0   CR 0.99   A1C 5.6        Diagnostics  No labs were ordered during this visit.   No EKG required, no history of coronary heart disease, significant arrhythmia, peripheral arterial disease or other structural heart disease.    Revised Cardiac Risk Index (RCRI)  The patient has the following serious cardiovascular risks for perioperative complications:   - No serious cardiac risks = 0 points     RCRI Interpretation: 0 points: Class I (very low risk - 0.4% complication rate)         Signed Electronically by: Johnson Chowdhury PA-C  Copy of this evaluation report is provided to requesting physician.

## 2024-04-24 NOTE — PATIENT INSTRUCTIONS
Preparing for Your Surgery  Getting started  A nurse will call you to review your health history and instructions. They will give you an arrival time based on your scheduled surgery time. Please be ready to share:  Your doctor's clinic name and phone number  Your medical, surgical, and anesthesia history  A list of allergies and sensitivities  A list of medicines, including herbal treatments and over-the-counter drugs  Whether the patient has a legal guardian (ask how to send us the papers in advance)  Please tell us if you're pregnant--or if there's any chance you might be pregnant. Some surgeries may injure a fetus (unborn baby), so they require a pregnancy test. Surgeries that are safe for a fetus don't always need a test, and you can choose whether to have one.   If you have a child who's having surgery, please ask for a copy of Preparing for Your Child's Surgery.    Preparing for surgery  Within 10 to 30 days of surgery: Have a pre-op exam (sometimes called an H&P, or History and Physical). This can be done at a clinic or pre-operative center.  If you're having a , you may not need this exam. Talk to your care team.  At your pre-op exam, talk to your care team about all medicines you take. If you need to stop any medicines before surgery, ask when to start taking them again.  We do this for your safety. Many medicines can make you bleed too much during surgery. Some change how well surgery (anesthesia) drugs work.  Call your insurance company to let them know you're having surgery. (If you don't have insurance, call [phone].)  Call your clinic if there's any change in your health. This includes signs of a cold or flu (sore throat, runny nose, cough, rash, fever). It also includes a scrape or scratch near the surgery site.  If you have questions on the day of surgery, call your hospital or surgery center.  Eating and drinking guidelines  For your safety: Unless your surgeon tells you otherwise,  follow the guidelines below.  Eat and drink as usual until 8 hours before you arrive for surgery. After that, no food or milk.  Drink clear liquids until 2 hours before you arrive. These are liquids you can see through, like water, Gatorade, and Propel Water. They also include plain black coffee and tea (no cream or milk), candy, and breath mints. You can spit out gum when you arrive.  If you drink alcohol: Stop drinking it the night before surgery.  If your care team tells you to take medicine on the morning of surgery, it's okay to take it with a sip of water.  Preventing infection  Shower or bathe the night before and morning of your surgery. Follow the instructions your clinic gave you. (If no instructions, use regular soap.)  Don't shave or clip hair near your surgery site. We'll remove the hair if needed.  Don't smoke or vape the morning of surgery. You may chew nicotine gum up to 2 hours before surgery. A nicotine patch is okay.  Note: Some surgeries require you to completely quit smoking and nicotine. Check with your surgeon.  Your care team will make every effort to keep you safe from infection. We will:  Clean our hands often with soap and water (or an alcohol-based hand rub).  Clean the skin at your surgery site with a special soap that kills germs.  Give you a special gown to keep you warm. (Cold raises the risk of infection.)  Wear special hair covers, masks, gowns and gloves during surgery.  Give antibiotic medicine, if prescribed. Not all surgeries need antibiotics.  What to bring on the day of surgery  Photo ID and insurance card  Copy of your health care directive, if you have one  Glasses and hearing aids (bring cases)  You can't wear contacts during surgery  Inhaler and eye drops, if you use them (tell us about these when you arrive)  CPAP machine or breathing device, if you use them  A few personal items, if spending the night  If you have . . .  A pacemaker, ICD (cardiac defibrillator) or other  implant: Bring the ID card.  An implanted stimulator: Bring the remote control.  A legal guardian: Bring a copy of the certified (court-stamped) guardianship papers.  Please remove any jewelry, including body piercings. Leave jewelry and other valuables at home.  If you're going home the day of surgery  You must have a responsible adult drive you home. They should stay with you overnight as well.  If you don't have someone to stay with you, and you aren't safe to go home alone, we may keep you overnight. Insurance often won't pay for this.  After surgery  If it's hard to control your pain or you need more pain medicine, please call your surgeon's office.  Questions?   If you have any questions for your care team, list them here: _________________________________________________________________________________________________________________________________________________________________________ ____________________________________ ____________________________________ ____________________________________  For informational purposes only. Not to replace the advice of your health care provider. Copyright   2003, 2019 [facility]. All rights reserved. Clinically reviewed by Janette Anguiano MD. [employer]ART[employer] [id number] - REV 12/22.

## 2024-04-25 ENCOUNTER — ANESTHESIA EVENT (OUTPATIENT)
Dept: SURGERY | Facility: AMBULATORY SURGERY CENTER | Age: 44
End: 2024-04-25
Payer: COMMERCIAL

## 2024-04-25 NOTE — ANESTHESIA PREPROCEDURE EVALUATION
Anesthesia Pre-Procedure Evaluation    Patient: Junior Rodriguez   MRN: [id number] : 1980        Procedure : Procedure(s):  right knee examination under anesthesia, knee arthroscopy, meniscus root repair          Past Medical History:   Diagnosis Date    Epididymitis     Multiple joint pain     Pancreatic cyst     Tobacco use       Past Surgical History:   Procedure Laterality Date    DENERVATION OF SPERMATIC CORD MICROSURGICAL Left 2015    Procedure: DENERVATION OF SPERMATIC CORD MICROSURGICAL;  Surgeon: Kiko Morgan MD;  Location: [facility] OR    EPIDIDYMECTOMY  2014    ESOPHAGOSCOPY, GASTROSCOPY, DUODENOSCOPY (EGD), COMBINED N/A 2022    Procedure: ESOPHAGOGASTRODUODENOSCOPY, WITH FINE NEEDLE ASPIRATION BIOPSY, WITH ENDOSCOPIC ULTRASOUND GUIDANCE;  Surgeon: Guru Kirt Rosenbaum MD;  Location: [facility] GI    VASECTOMY  2013      No Known Allergies   Social History     Tobacco Use    Smoking status: Every Day     Current packs/day: 1.00     Average packs/day: 1 pack/day for 30.0 years (30.0 ttl pk-yrs)     Types: Cigarettes    Smokeless tobacco: Never    Tobacco comments:     Plans to quit in future   Substance Use Topics    Alcohol use: No      Wt Readings from Last 1 Encounters:   24 95.3 kg (210 lb 3.2 oz)        Anesthesia Evaluation   Pt has had prior anesthetic. Type: General and MAC.    No history of anesthetic complications       ROS/MED HX  ENT/Pulmonary:     (+)                tobacco use,  1 packs/day,                      Neurologic:  - neg neurologic ROS     Cardiovascular:  - neg cardiovascular ROS     METS/Exercise Tolerance: >4 METS    Hematologic:  - neg hematologic  ROS     Musculoskeletal:  - neg musculoskeletal ROS     GI/Hepatic:  - neg GI/hepatic ROS     Renal/Genitourinary:  - neg Renal ROS     Endo:  - neg endo ROS     Psychiatric/Substance Use:  - neg psychiatric ROS     Infectious Disease:  - neg infectious disease ROS     Malignancy:  - neg malignancy ROS    "  Other:  - neg other ROS          Physical Exam    Airway        Mallampati: I   TM distance: > 3 FB   Neck ROM: full   Mouth opening: > 3 cm    Respiratory Devices and Support         Dental     Comment: Edentulous uppers    (+) Multiple visibly decayed, broken teeth      Cardiovascular   cardiovascular exam normal          Pulmonary   pulmonary exam normal                OUTSIDE LABS:  CBC:   Lab Results   Component Value Date    WBC 9.8 03/10/2022    WBC 7.1 05/29/2015    HGB 13.3 03/10/2022    HGB 13.5 05/29/2015    HCT 38.8 (L) 03/10/2022    HCT 39.3 (L) 05/29/2015     03/10/2022     05/29/2015     BMP:   Lab Results   Component Value Date     03/14/2024     05/29/2015    POTASSIUM 4.0 03/14/2024    POTASSIUM 3.8 05/29/2015    CHLORIDE 103 03/14/2024    CHLORIDE 106 05/29/2015    CO2 28 03/14/2024    CO2 25 05/29/2015    BUN 12.7 03/14/2024    BUN 13 05/29/2015    CR 0.99 03/14/2024    CR 0.87 05/29/2015    GLC 88 03/14/2024    GLC 99 02/15/2022     COAGS: No results found for: \"PTT\", \"INR\", \"FIBR\"  POC: No results found for: \"BGM\", \"HCG\", \"HCGS\"  HEPATIC:   Lab Results   Component Value Date    ALBUMIN 4.3 03/14/2024    PROTTOTAL 7.2 03/14/2024    ALT 18 03/14/2024    AST 23 03/14/2024    ALKPHOS 66 03/14/2024    BILITOTAL 0.3 03/14/2024     OTHER:   Lab Results   Component Value Date    A1C 5.6 03/14/2024    CRISTOPHER 9.1 03/14/2024    LIPASE 424 (H) 03/24/2022    CRP <2.9 02/15/2022    SED 8 02/15/2022       Anesthesia Plan    ASA Status:  2    NPO Status:  NPO Appropriate    Anesthesia Type: General.     - Airway: LMA   Induction: Intravenous, Propofol.   Maintenance: Balanced.        Consents    Anesthesia Plan(s) and associated risks, benefits, and realistic alternatives discussed. Questions answered and patient/representative(s) expressed understanding.     - Discussed: Risks, Benefits and Alternatives for BOTH SEDATION and the PROCEDURE were discussed     - Discussed with:  Patient "      - Extended Intubation/Ventilatory Support Discussed: No.      - Patient is DNR/DNI Status: No     Use of blood products discussed: No .     Postoperative Care    Pain management: IV analgesics, Oral pain medications, Multi-modal analgesia, Peripheral nerve block (Single Shot).   PONV prophylaxis: Dexamethasone or Solumedrol, Ondansetron (or other 5HT-3), Background Propofol Infusion     Comments:               Quincy Mckeon MD    I have reviewed the pertinent notes and labs in the chart from the past 30 days and (re)examined the patient.  Any updates or changes from those notes are reflected in this note.              # Overweight: Estimated body mass index is 28.51 kg/m  as calculated from the following:    Height as of 4/24/24: 1.829 m (6').    Weight as of 4/24/24: 95.3 kg (210 lb 3.2 oz).

## 2024-04-26 ENCOUNTER — ANESTHESIA (OUTPATIENT)
Dept: SURGERY | Facility: AMBULATORY SURGERY CENTER | Age: 44
End: 2024-04-26
Payer: COMMERCIAL

## 2024-04-26 ENCOUNTER — HOSPITAL ENCOUNTER (OUTPATIENT)
Facility: AMBULATORY SURGERY CENTER | Age: 44
Discharge: HOME | End: 2024-04-26
Attending: ORTHOPAEDIC SURGERY
Payer: COMMERCIAL

## 2024-04-26 VITALS
OXYGEN SATURATION: 99 % | HEART RATE: 60 BPM | WEIGHT: 210 LBS | TEMPERATURE: 97 F | DIASTOLIC BLOOD PRESSURE: 85 MMHG | SYSTOLIC BLOOD PRESSURE: 124 MMHG | BODY MASS INDEX: 28.44 KG/M2 | HEIGHT: 72 IN | RESPIRATION RATE: 16 BRPM

## 2024-04-26 DIAGNOSIS — M23.203 OLD PERIPHERAL TEAR OF MEDIAL MENISCUS OF RIGHT KNEE: Primary | ICD-10-CM

## 2024-04-26 PROCEDURE — 29882 ARTHRS KNE SRG MNISC RPR M/L: CPT | Mod: RT

## 2024-04-26 PROCEDURE — 29882 ARTHRS KNE SRG MNISC RPR M/L: CPT | Performed by: NURSE ANESTHETIST, CERTIFIED REGISTERED

## 2024-04-26 PROCEDURE — 29882 ARTHRS KNE SRG MNISC RPR M/L: CPT | Performed by: STUDENT IN AN ORGANIZED HEALTH CARE EDUCATION/TRAINING PROGRAM

## 2024-04-26 PROCEDURE — C9290 INJ, BUPIVACAINE LIPOSOME: HCPCS

## 2024-04-26 DEVICE — IMPLANTABLE DEVICE: Type: IMPLANTABLE DEVICE | Site: KNEE | Status: FUNCTIONAL

## 2024-04-26 RX ORDER — ONDANSETRON 2 MG/ML
4 INJECTION INTRAMUSCULAR; INTRAVENOUS EVERY 30 MIN PRN
Status: DISCONTINUED | OUTPATIENT
Start: 2024-04-26 | End: 2024-04-27 | Stop reason: HOSPADM

## 2024-04-26 RX ORDER — BUPIVACAINE HYDROCHLORIDE 2.5 MG/ML
INJECTION, SOLUTION EPIDURAL; INFILTRATION; INTRACAUDAL
Status: COMPLETED | OUTPATIENT
Start: 2024-04-26 | End: 2024-04-26

## 2024-04-26 RX ORDER — ACETAMINOPHEN 325 MG/1
975 TABLET ORAL ONCE
Status: DISCONTINUED | OUTPATIENT
Start: 2024-04-26 | End: 2024-04-26 | Stop reason: HOSPADM

## 2024-04-26 RX ORDER — HYDROXYZINE HYDROCHLORIDE 25 MG/1
25 TABLET, FILM COATED ORAL 3 TIMES DAILY PRN
Qty: 20 TABLET | Refills: 0 | Status: SHIPPED | OUTPATIENT
Start: 2024-04-26

## 2024-04-26 RX ORDER — LABETALOL HYDROCHLORIDE 5 MG/ML
10 INJECTION, SOLUTION INTRAVENOUS
Status: DISCONTINUED | OUTPATIENT
Start: 2024-04-26 | End: 2024-04-27 | Stop reason: HOSPADM

## 2024-04-26 RX ORDER — SODIUM CHLORIDE, SODIUM LACTATE, POTASSIUM CHLORIDE, CALCIUM CHLORIDE 600; 310; 30; 20 MG/100ML; MG/100ML; MG/100ML; MG/100ML
INJECTION, SOLUTION INTRAVENOUS CONTINUOUS PRN
Status: DISCONTINUED | OUTPATIENT
Start: 2024-04-26 | End: 2024-04-26

## 2024-04-26 RX ORDER — NALOXONE HYDROCHLORIDE 0.4 MG/ML
0.2 INJECTION, SOLUTION INTRAMUSCULAR; INTRAVENOUS; SUBCUTANEOUS
Status: DISCONTINUED | OUTPATIENT
Start: 2024-04-26 | End: 2024-04-26 | Stop reason: HOSPADM

## 2024-04-26 RX ORDER — ONDANSETRON 4 MG/1
4 TABLET, ORALLY DISINTEGRATING ORAL EVERY 30 MIN PRN
Status: DISCONTINUED | OUTPATIENT
Start: 2024-04-26 | End: 2024-04-27 | Stop reason: HOSPADM

## 2024-04-26 RX ORDER — BUPIVACAINE HYDROCHLORIDE AND EPINEPHRINE 2.5; 5 MG/ML; UG/ML
INJECTION, SOLUTION INFILTRATION; PERINEURAL PRN
Status: DISCONTINUED | OUTPATIENT
Start: 2024-04-26 | End: 2024-04-26 | Stop reason: HOSPADM

## 2024-04-26 RX ORDER — KETOROLAC TROMETHAMINE 30 MG/ML
INJECTION, SOLUTION INTRAMUSCULAR; INTRAVENOUS PRN
Status: DISCONTINUED | OUTPATIENT
Start: 2024-04-26 | End: 2024-04-26

## 2024-04-26 RX ORDER — OXYCODONE HYDROCHLORIDE 5 MG/1
5-10 TABLET ORAL EVERY 4 HOURS PRN
Qty: 20 TABLET | Refills: 0 | Status: SHIPPED | OUTPATIENT
Start: 2024-04-26 | End: 2024-06-06

## 2024-04-26 RX ORDER — LIDOCAINE HYDROCHLORIDE 20 MG/ML
INJECTION, SOLUTION INFILTRATION; PERINEURAL PRN
Status: DISCONTINUED | OUTPATIENT
Start: 2024-04-26 | End: 2024-04-26

## 2024-04-26 RX ORDER — PROPOFOL 10 MG/ML
INJECTION, EMULSION INTRAVENOUS CONTINUOUS PRN
Status: DISCONTINUED | OUTPATIENT
Start: 2024-04-26 | End: 2024-04-26

## 2024-04-26 RX ORDER — DEXAMETHASONE SODIUM PHOSPHATE 4 MG/ML
INJECTION, SOLUTION INTRA-ARTICULAR; INTRALESIONAL; INTRAMUSCULAR; INTRAVENOUS; SOFT TISSUE PRN
Status: DISCONTINUED | OUTPATIENT
Start: 2024-04-26 | End: 2024-04-26

## 2024-04-26 RX ORDER — NALOXONE HYDROCHLORIDE 0.4 MG/ML
0.4 INJECTION, SOLUTION INTRAMUSCULAR; INTRAVENOUS; SUBCUTANEOUS
Status: DISCONTINUED | OUTPATIENT
Start: 2024-04-26 | End: 2024-04-26 | Stop reason: HOSPADM

## 2024-04-26 RX ORDER — FENTANYL CITRATE 50 UG/ML
25 INJECTION, SOLUTION INTRAMUSCULAR; INTRAVENOUS EVERY 5 MIN PRN
Status: DISCONTINUED | OUTPATIENT
Start: 2024-04-26 | End: 2024-04-27 | Stop reason: HOSPADM

## 2024-04-26 RX ORDER — NALOXONE HYDROCHLORIDE 0.4 MG/ML
0.1 INJECTION, SOLUTION INTRAMUSCULAR; INTRAVENOUS; SUBCUTANEOUS
Status: DISCONTINUED | OUTPATIENT
Start: 2024-04-26 | End: 2024-04-27 | Stop reason: HOSPADM

## 2024-04-26 RX ORDER — PROPOFOL 10 MG/ML
INJECTION, EMULSION INTRAVENOUS PRN
Status: DISCONTINUED | OUTPATIENT
Start: 2024-04-26 | End: 2024-04-26

## 2024-04-26 RX ORDER — FENTANYL CITRATE 50 UG/ML
25-50 INJECTION, SOLUTION INTRAMUSCULAR; INTRAVENOUS
Status: DISCONTINUED | OUTPATIENT
Start: 2024-04-26 | End: 2024-04-26 | Stop reason: HOSPADM

## 2024-04-26 RX ORDER — OXYCODONE HYDROCHLORIDE 5 MG/1
5 TABLET ORAL
Status: DISCONTINUED | OUTPATIENT
Start: 2024-04-26 | End: 2024-04-27 | Stop reason: HOSPADM

## 2024-04-26 RX ORDER — ONDANSETRON 4 MG/1
4 TABLET, ORALLY DISINTEGRATING ORAL EVERY 8 HOURS PRN
Qty: 4 TABLET | Refills: 0 | Status: SHIPPED | OUTPATIENT
Start: 2024-04-26 | End: 2024-06-06

## 2024-04-26 RX ORDER — CEFAZOLIN SODIUM 2 G/50ML
2 SOLUTION INTRAVENOUS
Status: DISCONTINUED | OUTPATIENT
Start: 2024-04-26 | End: 2024-04-26 | Stop reason: HOSPADM

## 2024-04-26 RX ORDER — OXYCODONE HYDROCHLORIDE 5 MG/1
10 TABLET ORAL
Status: DISCONTINUED | OUTPATIENT
Start: 2024-04-26 | End: 2024-04-27 | Stop reason: HOSPADM

## 2024-04-26 RX ORDER — OXYCODONE HYDROCHLORIDE 5 MG/1
5 TABLET ORAL
Status: COMPLETED | OUTPATIENT
Start: 2024-04-26 | End: 2024-04-26

## 2024-04-26 RX ORDER — SODIUM CHLORIDE, SODIUM LACTATE, POTASSIUM CHLORIDE, CALCIUM CHLORIDE 600; 310; 30; 20 MG/100ML; MG/100ML; MG/100ML; MG/100ML
INJECTION, SOLUTION INTRAVENOUS CONTINUOUS
Status: DISCONTINUED | OUTPATIENT
Start: 2024-04-26 | End: 2024-04-26 | Stop reason: HOSPADM

## 2024-04-26 RX ORDER — LIDOCAINE 40 MG/G
CREAM TOPICAL
Status: DISCONTINUED | OUTPATIENT
Start: 2024-04-26 | End: 2024-04-26 | Stop reason: HOSPADM

## 2024-04-26 RX ORDER — ACETAMINOPHEN 325 MG/1
650 TABLET ORAL EVERY 4 HOURS PRN
Qty: 50 TABLET | Refills: 0 | Status: SHIPPED | OUTPATIENT
Start: 2024-04-26 | End: 2024-05-09

## 2024-04-26 RX ORDER — FENTANYL CITRATE 50 UG/ML
50 INJECTION, SOLUTION INTRAMUSCULAR; INTRAVENOUS EVERY 5 MIN PRN
Status: DISCONTINUED | OUTPATIENT
Start: 2024-04-26 | End: 2024-04-27 | Stop reason: HOSPADM

## 2024-04-26 RX ORDER — ONDANSETRON 2 MG/ML
INJECTION INTRAMUSCULAR; INTRAVENOUS PRN
Status: DISCONTINUED | OUTPATIENT
Start: 2024-04-26 | End: 2024-04-26

## 2024-04-26 RX ORDER — HYDROMORPHONE HYDROCHLORIDE 1 MG/ML
0.2 INJECTION, SOLUTION INTRAMUSCULAR; INTRAVENOUS; SUBCUTANEOUS EVERY 5 MIN PRN
Status: DISCONTINUED | OUTPATIENT
Start: 2024-04-26 | End: 2024-04-27 | Stop reason: HOSPADM

## 2024-04-26 RX ORDER — AMOXICILLIN 250 MG
1-2 CAPSULE ORAL 2 TIMES DAILY
Qty: 30 TABLET | Refills: 0 | Status: SHIPPED | OUTPATIENT
Start: 2024-04-26 | End: 2024-06-06

## 2024-04-26 RX ORDER — ONDANSETRON 4 MG/1
4 TABLET, ORALLY DISINTEGRATING ORAL
Status: DISCONTINUED | OUTPATIENT
Start: 2024-04-26 | End: 2024-04-27 | Stop reason: HOSPADM

## 2024-04-26 RX ORDER — MEPERIDINE HYDROCHLORIDE 25 MG/ML
12.5 INJECTION INTRAMUSCULAR; INTRAVENOUS; SUBCUTANEOUS EVERY 5 MIN PRN
Status: DISCONTINUED | OUTPATIENT
Start: 2024-04-26 | End: 2024-04-27 | Stop reason: HOSPADM

## 2024-04-26 RX ORDER — GLYCOPYRROLATE 0.2 MG/ML
INJECTION, SOLUTION INTRAMUSCULAR; INTRAVENOUS PRN
Status: DISCONTINUED | OUTPATIENT
Start: 2024-04-26 | End: 2024-04-26

## 2024-04-26 RX ORDER — SODIUM CHLORIDE, SODIUM LACTATE, POTASSIUM CHLORIDE, CALCIUM CHLORIDE 600; 310; 30; 20 MG/100ML; MG/100ML; MG/100ML; MG/100ML
INJECTION, SOLUTION INTRAVENOUS CONTINUOUS
Status: DISCONTINUED | OUTPATIENT
Start: 2024-04-26 | End: 2024-04-27 | Stop reason: HOSPADM

## 2024-04-26 RX ORDER — ASPIRIN 81 MG/1
162 TABLET ORAL DAILY
Qty: 56 TABLET | Refills: 0 | Status: SHIPPED | OUTPATIENT
Start: 2024-04-26 | End: 2024-05-24

## 2024-04-26 RX ORDER — ACETAMINOPHEN 325 MG/1
975 TABLET ORAL ONCE
Status: COMPLETED | OUTPATIENT
Start: 2024-04-26 | End: 2024-04-26

## 2024-04-26 RX ORDER — HYDROMORPHONE HYDROCHLORIDE 1 MG/ML
0.4 INJECTION, SOLUTION INTRAMUSCULAR; INTRAVENOUS; SUBCUTANEOUS EVERY 5 MIN PRN
Status: DISCONTINUED | OUTPATIENT
Start: 2024-04-26 | End: 2024-04-27 | Stop reason: HOSPADM

## 2024-04-26 RX ORDER — CEFAZOLIN SODIUM 2 G/50ML
2 SOLUTION INTRAVENOUS SEE ADMIN INSTRUCTIONS
Status: DISCONTINUED | OUTPATIENT
Start: 2024-04-26 | End: 2024-04-26 | Stop reason: HOSPADM

## 2024-04-26 RX ORDER — ACETAMINOPHEN 325 MG/1
650 TABLET ORAL
Status: DISCONTINUED | OUTPATIENT
Start: 2024-04-26 | End: 2024-04-27 | Stop reason: HOSPADM

## 2024-04-26 RX ORDER — FLUMAZENIL 0.1 MG/ML
0.2 INJECTION, SOLUTION INTRAVENOUS
Status: DISCONTINUED | OUTPATIENT
Start: 2024-04-26 | End: 2024-04-26 | Stop reason: HOSPADM

## 2024-04-26 RX ORDER — FENTANYL CITRATE 50 UG/ML
25 INJECTION, SOLUTION INTRAMUSCULAR; INTRAVENOUS
Status: DISCONTINUED | OUTPATIENT
Start: 2024-04-26 | End: 2024-04-27 | Stop reason: HOSPADM

## 2024-04-26 RX ORDER — HYDROXYZINE HYDROCHLORIDE 25 MG/1
25 TABLET, FILM COATED ORAL
Status: DISCONTINUED | OUTPATIENT
Start: 2024-04-26 | End: 2024-04-27 | Stop reason: HOSPADM

## 2024-04-26 RX ADMIN — PROPOFOL 120 MCG/KG/MIN: 10 INJECTION, EMULSION INTRAVENOUS at 11:22

## 2024-04-26 RX ADMIN — KETOROLAC TROMETHAMINE 15 MG: 30 INJECTION, SOLUTION INTRAMUSCULAR; INTRAVENOUS at 11:30

## 2024-04-26 RX ADMIN — FENTANYL CITRATE 50 MCG: 50 INJECTION, SOLUTION INTRAMUSCULAR; INTRAVENOUS at 12:05

## 2024-04-26 RX ADMIN — LIDOCAINE HYDROCHLORIDE 100 MG: 20 INJECTION, SOLUTION INFILTRATION; PERINEURAL at 10:13

## 2024-04-26 RX ADMIN — HYDROMORPHONE HYDROCHLORIDE 0.3 MG: 1 INJECTION, SOLUTION INTRAMUSCULAR; INTRAVENOUS; SUBCUTANEOUS at 12:10

## 2024-04-26 RX ADMIN — PROPOFOL 200 MCG/KG/MIN: 10 INJECTION, EMULSION INTRAVENOUS at 10:14

## 2024-04-26 RX ADMIN — GLYCOPYRROLATE 0.2 MG: 0.2 INJECTION, SOLUTION INTRAMUSCULAR; INTRAVENOUS at 10:18

## 2024-04-26 RX ADMIN — HYDROMORPHONE HYDROCHLORIDE 0.2 MG: 1 INJECTION, SOLUTION INTRAMUSCULAR; INTRAVENOUS; SUBCUTANEOUS at 12:20

## 2024-04-26 RX ADMIN — FENTANYL CITRATE 50 MCG: 50 INJECTION, SOLUTION INTRAMUSCULAR; INTRAVENOUS at 10:38

## 2024-04-26 RX ADMIN — ACETAMINOPHEN 975 MG: 325 TABLET ORAL at 08:29

## 2024-04-26 RX ADMIN — FENTANYL CITRATE 50 MCG: 50 INJECTION, SOLUTION INTRAMUSCULAR; INTRAVENOUS at 12:00

## 2024-04-26 RX ADMIN — OXYCODONE HYDROCHLORIDE 5 MG: 5 TABLET ORAL at 12:19

## 2024-04-26 RX ADMIN — SODIUM CHLORIDE, SODIUM LACTATE, POTASSIUM CHLORIDE, CALCIUM CHLORIDE: 600; 310; 30; 20 INJECTION, SOLUTION INTRAVENOUS at 11:14

## 2024-04-26 RX ADMIN — CEFAZOLIN SODIUM 2 G: 2 SOLUTION INTRAVENOUS at 10:10

## 2024-04-26 RX ADMIN — FENTANYL CITRATE 50 MCG: 50 INJECTION, SOLUTION INTRAMUSCULAR; INTRAVENOUS at 08:43

## 2024-04-26 RX ADMIN — FENTANYL CITRATE 50 MCG: 50 INJECTION, SOLUTION INTRAMUSCULAR; INTRAVENOUS at 10:18

## 2024-04-26 RX ADMIN — ONDANSETRON 4 MG: 2 INJECTION INTRAMUSCULAR; INTRAVENOUS at 10:34

## 2024-04-26 RX ADMIN — BUPIVACAINE HYDROCHLORIDE 10 ML: 2.5 INJECTION, SOLUTION EPIDURAL; INFILTRATION; INTRACAUDAL at 08:53

## 2024-04-26 RX ADMIN — SODIUM CHLORIDE, SODIUM LACTATE, POTASSIUM CHLORIDE, CALCIUM CHLORIDE: 600; 310; 30; 20 INJECTION, SOLUTION INTRAVENOUS at 09:45

## 2024-04-26 RX ADMIN — PROPOFOL 200 MG: 10 INJECTION, EMULSION INTRAVENOUS at 10:13

## 2024-04-26 RX ADMIN — DEXAMETHASONE SODIUM PHOSPHATE 4 MG: 4 INJECTION, SOLUTION INTRA-ARTICULAR; INTRALESIONAL; INTRAMUSCULAR; INTRAVENOUS; SOFT TISSUE at 10:21

## 2024-04-26 ASSESSMENT — LIFESTYLE VARIABLES: TOBACCO_USE: 1

## 2024-04-26 NOTE — PROGRESS NOTES
Patient received right side Adductor nerve block  with Exparel.  Fentanyl 50mcg and Versed 1mg given. Tolerated procedure well.

## 2024-04-26 NOTE — ANESTHESIA POSTPROCEDURE EVALUATION
Patient: Junior Rodriguez    Procedure: Procedure(s):  right knee examination under anesthesia, knee arthroscopy, meniscus root repair       Anesthesia Type:  General    Note:  Disposition: Outpatient   Postop Pain Control: Uneventful            Sign Out: Well controlled pain   PONV: No   Neuro/Psych: Uneventful            Sign Out: Acceptable/Baseline neuro status   Airway/Respiratory: Uneventful            Sign Out: Acceptable/Baseline resp. status   CV/Hemodynamics: Uneventful            Sign Out: Acceptable CV status; No obvious hypovolemia; No obvious fluid overload   Other NRE: NONE   DID A NON-ROUTINE EVENT OCCUR? No       Last vitals:  Vitals Value Taken Time   /80 04/26/24 1230   Temp 36.1  C (97  F) 04/26/24 1230   Pulse 60 04/26/24 1230   Resp 16 04/26/24 1230   SpO2 98 % 04/26/24 1230       Electronically Signed By: Jannet Brenner MD  April 26, 2024  1:37 PM

## 2024-04-26 NOTE — DISCHARGE INSTRUCTIONS
[facility]  Home Care Following Anesthesia  For 24 hours after surgery:  Get plenty of rest.  A responsible adult must stay with you for at least 24 hours after you leave the surgery center.  Do not drive or use heavy equipment.  If you have weakness or tingling, don't drive or use heavy equipment until this feeling goes away.   Do not drink alcohol.   Avoid strenuous or risky activities.  Ask for help when climbing stairs.  You may feel lightheaded.  IF so, sit for a few minutes before standing.  Have someone help you get up.   If you have nausea (feel sick to your stomach): Drink only clear liquids such as apple juice, ginger ale, broth or 7-Up.  Rest may also help.  Be sure to drink enough fluids.  Move to a regular diet as you feel able.   You may have a slight fever.  Call the doctor if your fever is over 100 F (37.7 C) (taken under the tongue) or lasts longer than 24 hours.  You may have a dry mouth, a sore throat, muscle aches or trouble sleeping. These should go away after 24 hours.  Do not make important or legal decisions.   It is recommended to avoid smoking.               Tips for taking pain medications  To get the best pain relief possible, remember these points:  Take pain medications as directed, before pain becomes severe.  Pain medication can upset your stomach: taking it with food may help.  Constipation is a common side effect of pain medication. Drink plenty of  fluids.  Eat foods high in fiber. Take a stool softener if recommended by your doctor or pharmacist.  Do not drink alcohol, drive or operate machinery while taking pain medications.  Ask about other ways to control pain, such as with heat, ice or relaxation.    Tylenol/Acetaminophen Consumption    If you feel your pain relief is insufficient, you may take Tylenol/Acetaminophen in addition to your narcotic pain medication.   Be careful not to exceed 4,000 mg of Tylenol/Acetaminophen in a 24 hour  period from all sources.  If you are taking extra strength Tylenol/acetaminophen (500 mg), the maximum dose is 8 tablets in 24 hours.  If you are taking regular strength acetaminophen (325 mg), the maximum dose is 12 tablets in 24 hours.    Call a doctor for any of the following:  Signs of infection (fever, growing tenderness at the surgery site, a large amount of drainage or bleeding, severe pain, foul-smelling drainage, redness, swelling).  It has been over 8 to 10 hours since surgery and you are still not able to urinate (pass water).  Headache for over 24 hours.  Numbness, tingling or weakness the day after surgery (if you had spinal anesthesia).  Signs of Covid-19 infection (temperature over 100 degrees, shortness of breath, cough, loss of taste/smell, generalized body aches, persistent headache, chills, sore throat, nausea/vomiting/diarrhea)  Your doctor is:       Dr. Andres Baker, Orthopaedics: [phone]               Or dial [phone] and ask for the resident on call for:  Orthopaedics  For emergency care, call the:  [facility] [place] [facility]: [phone] (TTY for hearing impaired: [phone])          Safety Tips for Using Crutches    Crutch Fit:  Assume good standing posture with shoulders relaxed and crutch tips 6-8 inches out from the side of the foot.  The underarm pad should fall 2-3 fingers width below the armpit.  The handgrip is positioned level with the wrist to allow 30  flexion at the elbow.    Safety Tips:  Bear weight on your hands, not on your armpits.  Do not add extra padding to the underarm pad. This will, in effect, lengthen the crutches and increase risk of nerve injury.  Wear flat, properly fitting shoes. Do not walk in stocking feet, high heels or slippers.  Household hazards:  --Throw rugs should be removed from floors.  --Stairs should be cleared of obstacles.  --Use extra caution on slippery, highly polished, littered or uneven floor surfaces.  --Check for electric  "cords.  Check crutch tips for excessive wear and keep wing nuts tight.  While walking, look forward with  head up  and  eyes open.  Take equal length steps.  Use BOTH crutches.    Stairs Sequence:  UP: \"Good\" leg first, followed by  bad  leg, then crutches.  DOWN: Crutches, followed by  bad  leg, \"good\" leg.     Walking with Crutches:  Move both crutches forward at the same time.  Non-Weight Bearing (NWB):  Hold the involved leg up and swing through the crutches with the involved leg. The involved leg does not touch the floor.  Toe Touch Weight Bearing (TTWB): Move the involved leg forward. Rest it lightly on the floor for balance only. Step through the crutches with the uninvolved leg.  Partial Weight Bearing (PWB): Move the involved leg forward. Step down the weight of the leg only.  Step through the crutches with the uninvolved leg.  Weight Bearing As Tolerated (WBAT): Move the involved leg forward. Put as much pressure through the involved leg as you can tolerate comfortably. Then step through the crutches with the uninvolved leg.              "

## 2024-04-26 NOTE — ANESTHESIA CARE TRANSFER NOTE
Patient: Junior Rodriguez    Procedure: Procedure(s):  right knee examination under anesthesia, knee arthroscopy, meniscus root repair       Diagnosis: Old peripheral tear of medial meniscus of right knee [M23.203]  Diagnosis Additional Information: No value filed.    Anesthesia Type:   General     Note:  Anesthesia Care Transfer Notewriter  Vitals:  Vitals Value Taken Time   /80 04/26/24 1230   Temp 36.1  C (97  F) 04/26/24 1230   Pulse 60 04/26/24 1230   Resp 16 04/26/24 1230   SpO2 98 % 04/26/24 1230       Electronically Signed By: POLLO Rush CRNA  April 26, 2024  12:56 PM

## 2024-04-26 NOTE — ANESTHESIA PROCEDURE NOTES
Airway       Patient location during procedure: OR  Staff -        CRNA: Isa Morales APRN CRNA       Performed By: CRNA  Consent for Airway        Urgency: elective  Indications and Patient Condition       Indications for airway management: adi-procedural       Induction type:intravenous       Mask difficulty assessment: 1 - vent by mask    Final Airway Details       Final airway type: supraglottic airway    Supraglottic Airway Details        Type: LMA       Brand: LMA Unique       LMA size: 5    Post intubation assessment        Placement verified by: capnometry, equal breath sounds and chest rise        Number of attempts at approach: 1       Secured with: tape       Ease of procedure: easy       Dentition: Unchanged

## 2024-04-26 NOTE — ANESTHESIA CARE TRANSFER NOTE
Patient: Junior Rodriguez    Procedure: Procedure(s):  right knee examination under anesthesia, knee arthroscopy, meniscus root repair       Diagnosis: Old peripheral tear of medial meniscus of right knee [M23.203]  Diagnosis Additional Information: No value filed.    Anesthesia Type:   General     Note:    Oropharynx: oropharynx clear of all foreign objects  Level of Consciousness: drowsy  Oxygen Supplementation: face mask  Level of Supplemental Oxygen (L/min / FiO2): 6  Independent Airway: airway patency satisfactory and stable  Dentition: dentition unchanged  Vital Signs Stable: post-procedure vital signs reviewed and stable  Report to RN Given: handoff report given  Patient transferred to: PACU  Comments: Resps easy and reg. Report to PACU RN   Handoff Report: Identifed the Patient, Identified the Reponsible Provider, Reviewed the pertinent medical history, Discussed the surgical course, Reviewed Intra-OP anesthesia mangement and issues during anesthesia, Set expectations for post-procedure period and Allowed opportunity for questions and acknowledgement of understanding      Vitals:  Vitals Value Taken Time   /80 04/26/24 1230   Temp 36.1  C (97  F) 04/26/24 1230   Pulse 60 04/26/24 1230   Resp 16 04/26/24 1230   SpO2 98 % 04/26/24 1230       Electronically Signed By: POLLO Rush CRNA  April 26, 2024  1:08 PM

## 2024-04-26 NOTE — ANESTHESIA PROCEDURE NOTES
Adductor canal Procedure Note    Pre-Procedure   Staff -        Anesthesiologist:  Quincy Mckeon MD       Resident/Fellow: Jean-Pierre Hassan MD       Performed By: resident       Location: pre-op       Pre-Anesthestic Checklist: patient identified, IV checked, site marked, risks and benefits discussed, informed consent, monitors and equipment checked, pre-op evaluation, at physician/surgeon's request and post-op pain management  Timeout:       Correct Patient: Yes        Correct Procedure: Yes        Correct Site: Yes        Correct Position: Yes        Correct Laterality: Yes        Site Marked: Yes  Procedure Documentation  Procedure: Adductor canal       Laterality: right       Patient Position: supine       Patient Prep/Sterile Barriers: sterile gloves, mask       Skin prep: Chloraprep       Needle Type: short bevel       Needle Gauge: 21.        Needle Length (millimeters): 110        Ultrasound guided       1. Ultrasound was used to identify targeted nerve, plexus, vascular marker, or fascial plane and place a needle adjacent to it in real-time.       2. Ultrasound was used to visualize the spread of anesthetic in close proximity to the above referenced structure.       3. A permanent image is entered into the patient's record.       4. The visualized anatomic structures appeared normal.       5. There were no apparent abnormal pathologic findings.    Assessment/Narrative         The placement was negative for: blood aspirated, painful injection and site bleeding       Paresthesias: No.       Bolus given via needle. no blood aspirated via catheter.        Secured via.        Insertion/Infusion Method: Single Shot       Complications: none    Medication(s) Administered   Bupivacaine 0.25% PF (Infiltration) - Infiltration   10 mL - 4/26/2024 8:53:00 AM  Bupivacaine liposome (Exparel) 1.3% LA inj susp (Infiltration) - Infiltration   10 mL - 4/26/2024 8:53:00 AM   Comments:  133mg liposomal bupivacaine  "given      FOR [facility] ONLY:   Pain Team Contact information: please page the Pain Team Via [facility]. Search \"Pain\". During daytime hours, please page the attending first. At night please page the resident first.      "

## 2024-04-26 NOTE — OP NOTE
PREOPERATIVE DIAGNOSIS:   Right Medial meniscus root tear    POSTOPERATIVE DIAGNOSIS:  Right Medial meniscus root tear    PROCEDURE:  Examination under anesthesia Right Knee  Right Knee arthroscopy  Transosseous repair of Right medial meniscus root    DATE OF SURGERY: 04/26/24      SURGEON: Andres Baker MD    ASSISTANT: Roxanne Fenton PA-C. The assistance of Mrs. Fenton was necessary for positioning, arthroscopic visualization, retraction, and meniscus root repair. There was no qualified available resident or fellow who was aware of the intricies of the procedure.    RESIDENT OR FELLOW: Horace Marrero    OPERATIVE INDICATIONS: Junior Rodriguez is a pleasant 43 year old male who I saw through my orthopedic clinic with a history, physical, imaging consistent with a medial meniscus root tears .  I reviewed with the patient the risks, benefits, complications, techniques and alternatives to surgery.  We reviewed the expected course of recovery and the potential expected outcomes.  I specifically discussed with her that there is not yet clear evidence that a successful surgery ultimately decreases the risk of knee replacement surgery.  I was very clear that it would not reverse the degenerative changes that had already occurred.  The patient understood both the risks and benefits and desired to proceed despite the risks.    OPERATIVE DETAILS: In the preoperative area the patient's informed consent was reviewed and they desired to proceed.  The right leg was marked and the patient was in agreement.  The patient was taken to the operating room where a timeout was performed and all parties were in agreement.  Preoperative antibiotics were given within 1 hour of the time of incision.  The patient was placed in the supine position and surrendered to LMA anesthesia.  No tourniquet was applied.  Egg crate was placed beneath the well leg and a side post was utilized.  The operative leg was prepped and draped in the usual sterile  fashion.     Examination Under Anesthesia: Range of motion was 0 to 125 degrees.  Stable to varus and valgus stress testing.  Stable anterior and posterior drawer testing.  No pivot shift.  Lachman 0, 1 quadrant medial lateral translation of the patella.    Anterior medial and anterolateral arthroscopic portals were created and the diagnostic arthroscopy was performed with the following findings: There were no loose bodies within the suprapatellar pouch, medial gutter, lateral gutter.  Lateral femoral condyle and lateral tibial plateau showed overall well-preserved cartilage normal.  ACL and PCL were intact.  Central trochlea showed normal.  Patella showed normal.  Medial tibial plateau cartilage was grade 2.  Medial femoral condyle was grade 3.  Clear tear of the medial meniscus root.  Lateral meniscus was intact.    At this time a cannula was placed in the medial portal and we prepared the meniscus bed with a curette and shaver until we had a good bed of bleeding bone. At this time a multiuse guide was introduced and a cannulated drill pin was placed into the anatomic insertion of the medial meniscus posterior root. A wire was then place and then this was exchanged for a loop of suture.  The suturelok device was then deployed and the implant was set. A scorpion suture passer was then used and our sutures were deployed through the mensicus utilizing a ripstop technique. We then completed shuttling through the implant and tension and retensioning was performed reducing the meniscus to the native footprint. No further instability probing.  Excellent fixation. Final arthroscopic images showed good position and excellent reduction of the meniscus to the tibial plateau    Copious irrigation was performed an a layered closure was initiated, sterile dressings were applied and the patient was transferred to the recovery room in stable condition with stable vital signs.    ESTIMATED BLOOD LOSS: 5 ml    TOURNIQUET TIME: No  tourniquet was placed.    COMPLICATIONS: None apparent.    DRAINS: None.    SPECIMENS: None.     POSTOPERATIVE PLAN:  Toe-touch weightbearing x 6weeks   Range of motion 0 to 90 degrees  Shower on day 3.  No submerging the wounds.   Physical therapy to start within 1 week  Follow-up with me in 1 week.  At 6 weeks begin weightbearing as tolerated  No running or impact sports for 4 to 6 months.  No kneeling or squatting for as long as possible   daily x 4 weeks

## 2024-04-29 NOTE — PROGRESS NOTES
"DIAGNOSIS:   Right Medial meniscus root tear    PROCEDURES: on 4/26/24 with Dr. Baker  Examination under anesthesia Right Knee  Right Knee arthroscopy  Transosseous repair of Right medial meniscus root    HISTORY:  On 3/17/24 patient stepped up on a step and heard a \"pop\" with significant pain associated in the right knee. He ultimately had an MRI demonstrating a right knee medial meniscus root tear.     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     EXAM:     General: Awake, Alert, and oriented. Articulates and communicates with a normal affect     Right Lower Extremity:  Incisions well healed without evidence of infection  Normal post-operative effusion and ecchymosis  Range of motion and stability exam not performed  Neurovascularly intact    IMAGING:  Radiographs of the right knee and arthroscopy images were independently reviewed by me and findings were discussed with the patient today.     ASSESSMENT:  42 y/o male 7 days post-op right knee medial meniscus root repair, doing well.     PLAN:   Toe-touch weightbearing x 6weeks. Has been compliant with crutches.  Range of motion 0 to 90 degrees  Shower OK. No submerging the wounds.   Physical therapy: 1 visit thus far. ROM restrictions reinforced. Encouraged to develop strong quad and comfortable 0-90 motion at 6 weeks. No more.   At 6 weeks begin weightbearing as tolerated  No running or impact sports for 4 to 6 months.  No kneeling or squatting for as long as possible  ASA 162mg daily x 2 more weeks  Sutures removed in clinic  Leave steri-strips in place until they fall off  OK to shower allowing water to run over incision  No soaking, scrubbing, baths, or lake for 1 additional week  Continue PT as scheduled, 1 visit post-op thus far  Pain medications reviewed. Patient requesting oxycodone refill. I refilled 10 tabs. Lengthy discussion about tapering. Encouraged to ice and take tylenol during the day, OK to alternate with Ibuprofen. " Regimen discussed. Consider tab of oxycodone in the evening to aid with sleep. If following restrictions, should require very little narcotic at this time. Patient demonstrated  understanding of this being last refill for this surgical service.    Operative report and arthroscopic images reviewed  Follow up at 6 weeks from the date of surgery with Dr. Baker with no new X-Rays needed      Ja Foreman PA-C, CAQ-OS  Dept. of Orthopedic Surgery  [facility]

## 2024-04-30 DIAGNOSIS — M23.203 OLD PERIPHERAL TEAR OF MEDIAL MENISCUS OF RIGHT KNEE: Primary | ICD-10-CM

## 2024-04-30 RX ORDER — OXYCODONE HYDROCHLORIDE 5 MG/1
5 TABLET ORAL EVERY 4 HOURS PRN
Qty: 12 TABLET | Refills: 0 | Status: SHIPPED | OUTPATIENT
Start: 2024-04-30 | End: 2024-05-03

## 2024-04-30 NOTE — TELEPHONE ENCOUNTER
Medication Refill Request    Has the patient contacted the pharmacy for the refill? Yes   Name of medication being requested: Oxycodone 5mg  Provider who prescribed the medication:  -Please call him when it is sent  Pharmacy: [facility]  Date medication is needed: ASAP   yes    Could we send this information to you in [facility] or would you prefer to receive a phone call?:   Patient would prefer a phone call   Okay to leave a detailed message?: Yes at Cell number on file:    Telephone Information:   Mobile [phone]

## 2024-05-01 ENCOUNTER — THERAPY VISIT (OUTPATIENT)
Dept: PHYSICAL THERAPY | Facility: CLINIC | Age: 44
End: 2024-05-01
Attending: ORTHOPAEDIC SURGERY
Payer: COMMERCIAL

## 2024-05-01 DIAGNOSIS — M23.203 OLD PERIPHERAL TEAR OF MEDIAL MENISCUS OF RIGHT KNEE: ICD-10-CM

## 2024-05-01 PROCEDURE — 97161 PT EVAL LOW COMPLEX 20 MIN: CPT | Mod: GP | Performed by: PHYSICAL THERAPIST

## 2024-05-01 PROCEDURE — 97110 THERAPEUTIC EXERCISES: CPT | Mod: GP | Performed by: PHYSICAL THERAPIST

## 2024-05-01 NOTE — PROGRESS NOTES
PHYSICAL THERAPY EVALUATION  Type of Visit: Evaluation    See electronic medical record for Abuse and Falls Screening details.    Subjective       Presenting condition or subjective complaint: knee    Pt presents s/p R medial meniscus repair on 4/26/24. Currently, 5 days post op. Pain has been manageable but worsens with movement. States the rest of his body has been more sore. Presents with bilateral crutches and TTWB. Occupation is in construction requiring lots of  work. Enjoys playing with kids.    Date of onset: 04/26/24    Relevant medical history:     Dates & types of surgery:      Prior diagnostic imaging/testing results: MRI     Prior therapy history for the same diagnosis, illness or injury: No      Prior Level of Function  independent    Living Environment  Social support: With a significant other or spouse   Type of home: House; 1 level   Stairs to enter the home: Yes 4 Is there a railing: Yes   Ramp: No   Stairs inside the home: No       Help at home:    Equipment owned: Crutches     Employment: Yes   Hobbies/Interests: life and kids    Patient goals for therapy: move    Pain assessment: Pain present     Objective   KNEE EVALUATION  PAIN: Pain Level at Rest: 2/10  Pain Level with Use: 6/10  Pain is Exacerbated By: movement and sleeping  INTEGUMENTARY (edema, incisions): Incisions healing appropriately, no signs of infection, stitches still present  POSTURE:   GAIT:  Weightbearing Status: TTWB  Assistive Device(s): Crutches (bilateral)  Gait Deviations:   BALANCE/PROPRIOCEPTION:   WEIGHTBEARING ALIGNMENT:   NON-WEIGHTBEARING ALIGNMENT:   ROM:   (Degrees) Left AROM Left PROM  Right AROM Right PROM   Knee Flexion   41    Knee Extension   Lacking 11 deg      STRENGTH:   Pain: - none + mild ++ moderate +++ severe  Strength Scale: 0-5/5 Left Right   Knee Flexion     Knee Extension     Hip flexion     Hip abduction     Hip extension     Quad Set  Fair     FLEXIBILITY:   SPECIAL TESTS: NT, post  op    FUNCTIONAL TESTS: NT, Post op  SLS:   PALPATION: swelling present 1+  JOINT MOBILITY:  impaired patellar mobility      Assessment & Plan   CLINICAL IMPRESSIONS  Medical Diagnosis: Old peripheral tear of medial meniscus of right knee (M23.203)    Treatment Diagnosis: R medial meniscal repair   Impression/Assessment: Patient is a 43 year old male with s/p R meniscal repair on 4/26/24.  The following significant findings have been identified: Pain, Decreased ROM/flexibility, Decreased joint mobility, Decreased strength, Impaired balance, Decreased proprioception, Inflammation, Edema, Impaired gait, Impaired muscle performance, Decreased activity tolerance, and Impaired posture. These impairments interfere with their ability to perform self care tasks, work tasks, recreational activities, household chores, driving , household mobility, and community mobility as compared to previous level of function.     Clinical Decision Making (Complexity):  Clinical Presentation: Evolving/Changing  Clinical Presentation Rationale: based on medical and personal factors listed in PT evaluation  Clinical Decision Making (Complexity): Low complexity    PLAN OF CARE  Treatment Interventions:  Modalities: Cryotherapy, E-stim  Interventions: Gait Training, Manual Therapy, Neuromuscular Re-education, Therapeutic Activity, Therapeutic Exercise, Self-Care/Home Management    Long Term Goals     PT Goal 1  Goal Identifier: LTG  Goal Description: Pt will demonstrate proper squat and ability to knee onto kneel >1 min in order to return to work without difficulty  Target Date: 08/21/24  PT Goal 2  Goal Identifier: LTG  Goal Description: Pt will demonstrate full knee ROM of 0-135 degrees in order to ambulate with a normalized gait pattern  Target Date: 07/24/24  PT Goal 3  Goal Identifier: STG  Goal Description: Pt will demonstrate SLR x10 without extensor lag in order to perform transitions with ease  Target Date: 06/12/24      Frequency of  Treatment: 1x week  Duration of Treatment: 16 weeks    Recommended Referrals to Other Professionals:   Education Assessment:   Learner/Method: Patient;Listening;Demonstration;Pictures/Video;Reading  Education Comments: Educated to ice after exercises and heat to thigh mm if needed. Discuss knee brace with surgeon on Friday appt,    Risks and benefits of evaluation/treatment have been explained.   Patient/Family/caregiver agrees with Plan of Care.     Evaluation Time:     PT Eval, Low Complexity Minutes (86045): 15       Signing Clinician: [facility]                                                                                   OUTPATIENT PHYSICAL THERAPY      PLAN OF TREATMENT FOR OUTPATIENT REHABILITATION   Patient's Last Name, First Name, Junior Plascencia YOB: 1980   Provider's Name   [facility]   Medical Record No.  [id number]     Onset Date: 04/26/24  Start of Care Date: 05/01/24     Medical Diagnosis:  Old peripheral tear of medial meniscus of right knee (M23.203)      PT Treatment Diagnosis:  R medial meniscal repair Plan of Treatment  Frequency/Duration: 1x week/ 16 weeks    Certification date from 05/01/24 to 08/21/24         See note for plan of treatment details and functional goals     Maria D Khalil, PT                         I CERTIFY THE NEED FOR THESE SERVICES FURNISHED UNDER        THIS PLAN OF TREATMENT AND WHILE UNDER MY CARE     (Physician attestation of this document indicates review and certification of the therapy plan).              Referring Provider:  Andres Baker MD    Initial Assessment  See Epic Evaluation- Start of Care Date: 05/01/24

## 2024-05-03 ENCOUNTER — OFFICE VISIT (OUTPATIENT)
Dept: ORTHOPEDICS | Facility: CLINIC | Age: 44
End: 2024-05-03
Payer: COMMERCIAL

## 2024-05-03 DIAGNOSIS — Z98.890 S/P MEDIAL MENISCAL REPAIR: Primary | ICD-10-CM

## 2024-05-03 DIAGNOSIS — Z98.890 S/P ARTHROSCOPY OF RIGHT KNEE: ICD-10-CM

## 2024-05-03 PROCEDURE — 99024 POSTOP FOLLOW-UP VISIT: CPT | Performed by: PHYSICIAN ASSISTANT

## 2024-05-03 RX ORDER — OXYCODONE HYDROCHLORIDE 5 MG/1
5-10 TABLET ORAL EVERY 8 HOURS PRN
Qty: 10 TABLET | Refills: 0 | Status: SHIPPED | OUTPATIENT
Start: 2024-05-03 | End: 2024-05-06

## 2024-05-03 ASSESSMENT — PAIN SCALES - GENERAL: PAINLEVEL: MODERATE PAIN (4)

## 2024-05-03 NOTE — LETTER
"    5/3/2024         RE: Junior Rodriguez  [address] [facility] [address]  [place] [facility] [place] [address]        Dear Colleague,    Thank you for referring your patient, Junior Rodriguez, to the [facility]. Please see a copy of my visit note below.    DIAGNOSIS:   Right Medial meniscus root tear    PROCEDURES: on 4/26/24 with Dr. Baker  Examination under anesthesia Right Knee  Right Knee arthroscopy  Transosseous repair of Right medial meniscus root    HISTORY:  On 3/17/24 patient stepped up on a step and heard a \"pop\" with significant pain associated in the right knee. He ultimately had an MRI demonstrating a right knee medial meniscus root tear.     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     EXAM:     General: Awake, Alert, and oriented. Articulates and communicates with a normal affect     Right Lower Extremity:  Incisions well healed without evidence of infection  Normal post-operative effusion and ecchymosis  Range of motion and stability exam not performed  Neurovascularly intact    IMAGING:  Radiographs of the right knee and arthroscopy images were independently reviewed by me and findings were discussed with the patient today.     ASSESSMENT:  44 y/o male 7 days post-op right knee medial meniscus root repair, doing well.     PLAN:   Toe-touch weightbearing x 6weeks. Has been compliant with crutches.  Range of motion 0 to 90 degrees  Shower OK. No submerging the wounds.   Physical therapy: 1 visit thus far. ROM restrictions reinforced. Encouraged to develop strong quad and comfortable 0-90 motion at 6 weeks. No more.   At 6 weeks begin weightbearing as tolerated  No running or impact sports for 4 to 6 months.  No kneeling or squatting for as long as possible  ASA 162mg daily x 2 more weeks  Sutures removed in clinic  Leave steri-strips in place until they fall off  OK to shower allowing water to run over incision  No soaking, scrubbing, baths, or lake for 1 additional " week  Continue PT as scheduled, 1 visit post-op thus far  Pain medications reviewed. Patient requesting oxycodone refill. I refilled 10 tabs. Lengthy discussion about tapering. Encouraged to ice and take tylenol during the day, OK to alternate with Ibuprofen. Regimen discussed. Consider tab of oxycodone in the evening to aid with sleep. If following restrictions, should require very little narcotic at this time. Patient demonstrated  understanding of this being last refill for this surgical service.    Operative report and arthroscopic images reviewed  Follow up at 6 weeks from the date of surgery with Dr. Baker with no new X-Rays needed      Ja Foreman PA-C, CAQ-OS  Dept. of Orthopedic Surgery  [facility]      Again, thank you for allowing me to participate in the care of your patient.        Sincerely,        ABDI Shelley

## 2024-05-08 ENCOUNTER — THERAPY VISIT (OUTPATIENT)
Dept: PHYSICAL THERAPY | Facility: CLINIC | Age: 44
End: 2024-05-08
Attending: ORTHOPAEDIC SURGERY
Payer: COMMERCIAL

## 2024-05-08 DIAGNOSIS — M23.203 OLD PERIPHERAL TEAR OF MEDIAL MENISCUS OF RIGHT KNEE: ICD-10-CM

## 2024-05-08 DIAGNOSIS — M23.203 OLD PERIPHERAL TEAR OF MEDIAL MENISCUS OF RIGHT KNEE: Primary | ICD-10-CM

## 2024-05-08 PROCEDURE — 97110 THERAPEUTIC EXERCISES: CPT | Mod: GP | Performed by: PHYSICAL THERAPIST

## 2024-05-08 NOTE — TELEPHONE ENCOUNTER
[facility] Call Center    Phone Message    May a detailed message be left on voicemail: yes     Reason for Call: Other: patient calling for Rx refill:      acetaminophen (TYLENOL) 325 MG tablet   oxyCODONE (ROXICODONE) 5 MG tablet   [id number] - [place], [place] - [address] [place] [address] AT [facility] & [address]   Action Taken: Other: te    Travel Screening: Not Applicable

## 2024-05-09 NOTE — TELEPHONE ENCOUNTER
Called and talked with patient, he states that he is having a lot of stiffness and pain mostly after PT and HEP. When he gets home from PT he takes 2 Oxycodone and then at night he will need another 2 to help him get to sleep. He states that night is the worst for his pain and he tosses and turns throughout the night. He is taking his Aspirin dosing and Acetaminophen   Talked with patient about his pain and the conversation he had with Cesario Foreman last week about pain medication. Patient states that his pain with PT has been more than what he thought it would be. We discussed that Dr. Baker would be ok with 1 additional refill of Oxycodone but he will get no further narcotics after this. He expressed understanding. We also discussed that he would do well with an NSAID dosing to help with the stiffness he is having. Will send in for a prescription for ibuprofen. He also needs a refill on Acetaminophen.   He is icing and elevating. He has swelling after his PT session but it goes down after icing. He is following his restrictions.   Refills sent to provider.  Farhana Logan RN

## 2024-05-10 RX ORDER — ACETAMINOPHEN 325 MG/1
650 TABLET ORAL EVERY 4 HOURS PRN
Qty: 50 TABLET | Refills: 0 | Status: SHIPPED | OUTPATIENT
Start: 2024-05-10

## 2024-05-10 RX ORDER — IBUPROFEN 800 MG/1
800 TABLET, FILM COATED ORAL EVERY 8 HOURS PRN
Qty: 30 TABLET | Refills: 0 | Status: SHIPPED | OUTPATIENT
Start: 2024-05-10

## 2024-05-10 RX ORDER — OXYCODONE HYDROCHLORIDE 5 MG/1
5 TABLET ORAL EVERY 6 HOURS PRN
Qty: 10 TABLET | Refills: 0 | Status: SHIPPED | OUTPATIENT
Start: 2024-05-10 | End: 2024-05-13

## 2024-05-22 ENCOUNTER — THERAPY VISIT (OUTPATIENT)
Dept: PHYSICAL THERAPY | Facility: CLINIC | Age: 44
End: 2024-05-22
Attending: ORTHOPAEDIC SURGERY
Payer: COMMERCIAL

## 2024-05-22 DIAGNOSIS — M23.203 OLD PERIPHERAL TEAR OF MEDIAL MENISCUS OF RIGHT KNEE: Primary | ICD-10-CM

## 2024-05-22 PROCEDURE — 97110 THERAPEUTIC EXERCISES: CPT | Mod: GP | Performed by: PHYSICAL THERAPIST

## 2024-05-29 ENCOUNTER — THERAPY VISIT (OUTPATIENT)
Dept: PHYSICAL THERAPY | Facility: CLINIC | Age: 44
End: 2024-05-29
Attending: ORTHOPAEDIC SURGERY
Payer: COMMERCIAL

## 2024-05-29 DIAGNOSIS — M23.203 OLD PERIPHERAL TEAR OF MEDIAL MENISCUS OF RIGHT KNEE: Primary | ICD-10-CM

## 2024-05-29 PROCEDURE — 97110 THERAPEUTIC EXERCISES: CPT | Mod: GP | Performed by: PHYSICAL THERAPIST

## 2024-05-31 ENCOUNTER — TELEPHONE (OUTPATIENT)
Dept: FAMILY MEDICINE | Facility: CLINIC | Age: 44
End: 2024-05-31

## 2024-05-31 NOTE — TELEPHONE ENCOUNTER
"See telephone encounter below.     Spoke with patient who states 2 days ago he was working on an old car and feels he got a piece of \"rust or glass I really am not sure\" in my right eye.     C/O sclera redness, irritation, sensitive to light, feels something lodge in his peripheral vision, redness and warmth around his eye lid.    Date of last TD per [facility] 1/1/2015.    Huddled with Molina PATTON who advised patient to be seen by ophthalmology today for evaluation and should come in for a TD vaccine on the MA only schedule. Patient was informed of this information and will contact his ophthalmologist for an appointment then will set up TD appointment.     Julie Behrendt RN    "

## 2024-05-31 NOTE — TELEPHONE ENCOUNTER
Symptoms    Describe your symptoms: patient called stating that he has something in his right eye, reports it may be rust from an old car he was working on.    Any pain: Yes: irratating.     How long have you been having symptoms:   2 days    Have you been seen for this:  No        Preferred Pharmacy:   [id number] - [place], [place] - [address] [facility] AT [facility] [address]  [address] [facility] [address]  Phone: [phone] Fax: [phone]      [facility] - [place], [place] - [address], [address]  [address]  [facility] [address]  Phone: [phone] Fax: [phone]      Okay to leave a detailed message?: Yes at Cell number on file:    Telephone Information:   Mobile [phone]      Patient coming in due to low satruation and difficulty breathing at home. Normally on 3L of oxygen but increased to 5L with no help. EMS called by visiting nurse. Pt had a duoneb in route. Still saturating in the 70s in route.

## 2024-06-06 ENCOUNTER — OFFICE VISIT (OUTPATIENT)
Dept: ORTHOPEDICS | Facility: CLINIC | Age: 44
End: 2024-06-06

## 2024-06-06 DIAGNOSIS — Z98.890 S/P MEDIAL MENISCAL REPAIR: Primary | ICD-10-CM

## 2024-06-06 PROCEDURE — 99024 POSTOP FOLLOW-UP VISIT: CPT | Performed by: ORTHOPAEDIC SURGERY

## 2024-06-06 ASSESSMENT — PAIN SCALES - GENERAL: PAINLEVEL: NO PAIN (0)

## 2024-06-06 NOTE — PROGRESS NOTES
DIAGNOSIS:   Right Medial meniscus root tear    PROCEDURES: on 4/26/24 with Dr. Baker  Examination under anesthesia Right Knee  Right Knee arthroscopy  Transosseous repair of Right medial meniscus root    HISTORY:  Doing well, pain controlled, off opioids, observant of restrictions    EXAM:     General: Awake, Alert, and oriented. Articulates and communicates with a normal affect     Right Lower Extremity:  Incisions well healed without evidence of infection  No post-operative effusion or ecchymosis  Range of motion 0-90  stability exam not performed  Neurovascularly intact    IMAGING:  No new imaging    ASSESSMENT:  6 weeks following meniscus root repair    PLAN: wbat  Rom as rhonda  No running or jumping  Limit kneeling or squatting as able  Wean from crutches  Followup as needed

## 2024-06-06 NOTE — LETTER
6/6/2024      Junior Rodriguez  [address]  [place] [address]      Dear Colleague,    Thank you for referring your patient, Junior Rodriguez, to the [facility]. Please see a copy of my visit note below.    DIAGNOSIS:   Right Medial meniscus root tear    PROCEDURES: on 4/26/24 with Dr. Baker  Examination under anesthesia Right Knee  Right Knee arthroscopy  Transosseous repair of Right medial meniscus root    HISTORY:  Doing well, pain controlled, off opioids, observant of restrictions    EXAM:     General: Awake, Alert, and oriented. Articulates and communicates with a normal affect     Right Lower Extremity:  Incisions well healed without evidence of infection  No post-operative effusion or ecchymosis  Range of motion 0-90  stability exam not performed  Neurovascularly intact    IMAGING:  No new imaging    ASSESSMENT:  6 weeks following meniscus root repair    PLAN: wbat  Rom as rhonda  No running or jumping  Limit kneeling or squatting as able  Wean from crutches  Followup as needed      Again, thank you for allowing me to participate in the care of your patient.        Sincerely,        Andres Baker MD

## 2024-06-06 NOTE — NURSING NOTE
Reason For Visit:   Chief Complaint   Patient presents with    Surgical Followup     6wk s.p meniscus root repair        ?  No  Occupation.   Currently working? Yes.  Work status?  Full time.  Date of surgery: 4/15/24  Type of surgery: Meniscus root repair.      Sane Score  Right  knee - Affected  Left Knee- 100  Right Knee- 60      Mello Haro, ATC

## 2024-07-17 NOTE — PROGRESS NOTES
DISCHARGE  Reason for Discharge: Pt self discharged from care as he has not returned back to PT. Please refer to patient's most recent progress note for further subjective and objective information, progress toward goals, and intervention provided during the episode of care.       Equipment Issued: none    Discharge Plan: Patient to continue home program.    Referring Provider:  Andres Baker       05/29/24 0500   Appointment Info   Signing clinician's name / credentials Maria D Khalil, PT, DPT   Visits Used 4   Medical Diagnosis Old peripheral tear of medial meniscus of right knee (M23.203)   PT Tx Diagnosis R medial meniscal repair   Precautions/Limitations TTWB x6 weeks, 0-90 deg ROM   Quick Adds Certification   Progress Note/Certification   Start of Care Date 05/01/24   Onset of illness/injury or Date of Surgery 04/26/24   Therapy Frequency 1x week   Predicted Duration 16 weeks   Certification date from 05/01/24   Certification date to 08/21/24   Progress Note Due Date 07/10/24   Progress Note Completed Date 05/01/24   GOALS   PT Goals 2;3;4   PT Goal 1   Goal Identifier LTG   Goal Description Pt will demonstrate proper squat and ability to knee onto kneel >1 min in order to return to work without difficulty   Target Date 08/21/24   PT Goal 2   Goal Identifier LTG   Goal Description Pt will demonstrate full knee ROM of 0-135 degrees in order to ambulate with a normalized gait pattern   Target Date 07/24/24   PT Goal 3   Goal Identifier STG   Goal Description Pt will demonstrate SLR x10 without extensor lag in order to perform transitions with ease   Goal Progress quad activation improved   Target Date 06/12/24   Subjective Report   Subjective Report Pt presents s/p R medial meniscus repair on 4/26/24. After PT last week he was pretty sore.   Objective Measures   Objective Measures Objective Measure 1   Objective Measure 1   Objective Measure knee ROM   Details 0-90 deg, initially at 4 deg  extension, improved after stretching. able to reach 110 deg with pain at end range   Treatment Interventions (PT)   Interventions Therapeutic Procedure/Exercise   Therapeutic Procedure/Exercise   Therapeutic Procedures: strength, endurance, ROM, flexibility minutes (51723) 23   Ther Proc 1 supine knee ext hang x5 min on/off PT OP, supine quad set 10x5 sec, SLR x25, butt burners (full arc, lower arc, high arc, piriformis) x10 each, clam shell to hip abduction x10, seated LAQ x5   Skilled Intervention improve ROM and quad activation   Patient Response/Progress increased ROM and quad/glute med recruitment   Ther Proc 2 Discussed making PT appt after MD next week   Therapeutic Procedures Ther Proc 2   Eval/Assessments   Assessments   (S/p R meniscal surgery 5 weeks showing good progression with knee ROM and strength. Focused on gluteal activation today, pt had fatigue of musculature. Follows up with MD next for updates on restriction)   Education   Learner/Method Patient;Listening;Demonstration;Pictures/Video;Reading   Education Comments Informed pt of precautions: stay within 90 deg flexion and no weight onto RLE   Plan   Home program Papers- supine and seated knee ext hang, supine and seated heel slides, quad sets, SLR, patellar mobs in all 4 directions   Plan for next session week 6 meniscal root repair protocol   Total Session Time   Timed Code Treatment Minutes 23   Total Treatment Time (sum of timed and untimed services) 23

## 2024-08-28 ENCOUNTER — TELEPHONE (OUTPATIENT)
Dept: ORTHOPEDICS | Facility: CLINIC | Age: 44
End: 2024-08-28
Payer: COMMERCIAL

## 2024-08-28 DIAGNOSIS — Z98.890 S/P MEDIAL MENISCAL REPAIR: Primary | ICD-10-CM

## 2024-08-28 NOTE — TELEPHONE ENCOUNTER
About 1 week ago with weather changes started swelling up and got really stiff. Last 2 days very warm and hard to move. Pt reports no changes in activity prior to onset of swelling. Swelling has minimally gone down since initial presentation but Pt reports still working and not seeing a large change. Pt has the upcoming weekend off due to Holiday and will do a 3 day sustained regiment of NSAID's coupled with Icing, Elevating and resting to try and address swelling. Reports loss of motion and pain secondary to swelling which we will monitor for after swelling has been controlled. Reiterated Red Flag signs for infection and also let Pt know if pain increases or new symptoms arise to go to UC or ED for immediate care. Pt scheduled for Follow-up on 9/5 and will update us prior if need be. No further questions from Pt, they are in agreement and understanding of plan.

## 2024-08-28 NOTE — TELEPHONE ENCOUNTER
Other: Requesting c/b- was doing well- now all of a sudden knee is swollen, warm & hard to move -no fever no redness     Could we send this information to you in [employer] ([employer]) or would you prefer to receive a phone call?:   Patient would prefer a phone call   Okay to leave a detailed message?: No at Cell number on file:    Telephone Information:   Mobile [phone]

## 2024-09-05 ENCOUNTER — ANCILLARY PROCEDURE (OUTPATIENT)
Dept: GENERAL RADIOLOGY | Facility: CLINIC | Age: 44
End: 2024-09-05
Attending: ORTHOPAEDIC SURGERY
Payer: COMMERCIAL

## 2024-09-05 ENCOUNTER — OFFICE VISIT (OUTPATIENT)
Dept: ORTHOPEDICS | Facility: CLINIC | Age: 44
End: 2024-09-05
Payer: COMMERCIAL

## 2024-09-05 DIAGNOSIS — Z98.890 S/P MEDIAL MENISCAL REPAIR: ICD-10-CM

## 2024-09-05 DIAGNOSIS — Z98.890 S/P MEDIAL MENISCAL REPAIR: Primary | ICD-10-CM

## 2024-09-05 PROCEDURE — 99214 OFFICE O/P EST MOD 30 MIN: CPT | Performed by: ORTHOPAEDIC SURGERY

## 2024-09-05 PROCEDURE — 73562 X-RAY EXAM OF KNEE 3: CPT | Mod: RT | Performed by: RADIOLOGY

## 2024-09-05 RX ORDER — DICLOFENAC SODIUM 75 MG/1
75 TABLET, DELAYED RELEASE ORAL 2 TIMES DAILY
Qty: 60 TABLET | Refills: 0 | Status: SHIPPED | OUTPATIENT
Start: 2024-09-05

## 2024-09-05 NOTE — PROGRESS NOTES
"DIAGNOSIS:   4 month post R meniscus root repair with worsening swelling and pain of R knee over the past two weeks    PROCEDURES:  R meniscus root repair - DOS 4/26/24    HISTORY:  Patient presents to clinic 4 month post R meniscus root repair due to worsening swelling and pain of R knee over the past two weeks. States it was sudden with throbbing pain radiating down to his ankle, worsen with walking and knee extension. Also states his knee feels unstable and like a \"hinge door\" when trying to extend it. Patient denies any trauma to the knee, N/V/F, and recent illness.     EXAM:     General: Awake, Alert, and oriented. Articulates and communicates with a normal affect     R Lower Extremity:  Incisions well healed without evidence of infection  Trace effusion or ecchymosis  Range of motion 0-135  Knee is stable to examination  Neurovascularly intact    IMAGING:  Radiographs of the R knee from 09/05/2024 were independently reviewed by me and findings were discussed with the patient today. The imaging demonstrates no new changes or deformities. No fractures or dislocations.well preserved joint    ASSESSMENT:  4 and half month status post meniscus root repair with increased pain and swelling over the last 2 weeks    PLAN:   Will start diclofenac 75 mg p.o. twice daily on a scheduled basis for the next 2 weeks and then transition to as needed.  If does well can return to desired activities.  He could repeat this if necessary in the future.    If not seeing improvement despite therapy program and oral anti-inflammatories he will reach out to my office via phone call or MyChart we will order an MRI of his knee and I will plan to see him back afterwards.  "

## 2024-09-05 NOTE — NURSING NOTE
Reason For Visit:   Chief Complaint   Patient presents with    Surgical Followup     4m s/p root repair, recurrent swelling and pain        ?  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  Date of surgery: 4/15/24  Type of surgery: Root repair.      Sane Score  Right  knee - Affected  Left Knee- 100  Right Knee- 30      Mello Haro ATC

## 2024-09-05 NOTE — LETTER
"9/5/2024      Junior Rodriguez  [address] [address]  [facility] [address]      Dear Colleague,    Thank you for referring your patient, Junior Rodriguez, to the [facility]. Please see a copy of my visit note below.    DIAGNOSIS:   4 month post R meniscus root repair with worsening swelling and pain of R knee over the past two weeks    PROCEDURES:  R meniscus root repair - DOS 4/26/24    HISTORY:  Patient presents to clinic 4 month post R meniscus root repair due to worsening swelling and pain of R knee over the past two weeks. States it was sudden with throbbing pain radiating down to his ankle, worsen with walking and knee extension. Also states his knee feels unstable and like a \"hinge door\" when trying to extend it. Patient denies any trauma to the knee, N/V/F, and recent illness.     EXAM:     General: Awake, Alert, and oriented. Articulates and communicates with a normal affect     R Lower Extremity:  Incisions well healed without evidence of infection  Trace effusion or ecchymosis  Range of motion 0-135  Knee is stable to examination  Neurovascularly intact    IMAGING:  Radiographs of the R knee from 09/05/2024 were independently reviewed by me and findings were discussed with the patient today. The imaging demonstrates no new changes or deformities. No fractures or dislocations.well preserved joint    ASSESSMENT:  4 and half month status post meniscus root repair with increased pain and swelling over the last 2 weeks    PLAN:   Will start diclofenac 75 mg p.o. twice daily on a scheduled basis for the next 2 weeks and then transition to as needed.  If does well can return to desired activities.  He could repeat this if necessary in the future.    If not seeing improvement despite therapy program and oral anti-inflammatories he will reach out to my office via phone call or MyChart we will order an MRI of his knee and I will plan to see him back afterwards.      Again, thank you for " allowing me to participate in the care of your patient.        Sincerely,        Andres Baker MD

## 2025-02-12 ENCOUNTER — PATIENT OUTREACH (OUTPATIENT)
Dept: CARE COORDINATION | Facility: CLINIC | Age: 45
End: 2025-02-12
Payer: COMMERCIAL

## 2025-02-26 ENCOUNTER — PATIENT OUTREACH (OUTPATIENT)
Dept: CARE COORDINATION | Facility: CLINIC | Age: 45
End: 2025-02-26
Payer: COMMERCIAL

## 2025-03-12 ENCOUNTER — HOSPITAL ENCOUNTER (OUTPATIENT)
Dept: MRI IMAGING | Facility: CLINIC | Age: 45
Discharge: HOME | End: 2025-03-12
Attending: ORTHOPAEDIC SURGERY
Payer: COMMERCIAL

## 2025-03-12 ENCOUNTER — OFFICE VISIT (OUTPATIENT)
Dept: ORTHOPEDICS | Facility: CLINIC | Age: 45
End: 2025-03-12
Payer: COMMERCIAL

## 2025-03-12 VITALS — HEIGHT: 71 IN | WEIGHT: 200 LBS | BODY MASS INDEX: 28 KG/M2

## 2025-03-12 DIAGNOSIS — Z98.890 S/P MEDIAL MENISCAL REPAIR: Primary | ICD-10-CM

## 2025-03-12 DIAGNOSIS — Z98.890 S/P MEDIAL MENISCAL REPAIR: ICD-10-CM

## 2025-03-12 PROCEDURE — 1125F AMNT PAIN NOTED PAIN PRSNT: CPT | Performed by: ORTHOPAEDIC SURGERY

## 2025-03-12 PROCEDURE — 99213 OFFICE O/P EST LOW 20 MIN: CPT | Mod: GC | Performed by: ORTHOPAEDIC SURGERY

## 2025-03-12 PROCEDURE — 73721 MRI JNT OF LWR EXTRE W/O DYE: CPT | Mod: RT

## 2025-03-12 NOTE — LETTER
3/12/2025      Junior Rodriguez  [address] [address]  [facility] [address]      Dear Colleague,    Thank you for referring your patient, Junior Rodriguez, to the [facility]. Please see a copy of my visit note below.    DIAGNOSIS:   ~11 month post R meniscus root repair with worsening swelling and pain of R knee over the past two weeks    PROCEDURES:  R meniscus root repair - DOS 4/26/24    HISTORY:  Patient presents to clinic for follow-up approximately 11 months status post the above-stated procedure.  He had a change in his symptoms approximately 3-4 weeks ago without any known traumatic event.  He states that he began to have increasing lateral sided pain with more crepitus.  More recently over the last week, he has had exquisite medial sided knee pain.  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  He denies any overt locking but does notice some stiffness.  He states that his current symptoms feel different compared to when he had pain in September.  He denies any trauma to the knee, nausea, vomiting, fevers, recent illness, or numbness or tingling.    EXAM:     General: Awake, Alert, and oriented. Articulates and communicates with a normal affect     R Lower Extremity:  Incisions well healed without evidence of infection  Mild effusion   Range of motion 10 degrees shy of full extension to 125  Knee is stable to examination  Neurovascularly intact    IMAGING:  No new images obtained today    ASSESSMENT:  ~11 months status post meniscus root repair with increased pain and swelling over the last 2 weeks    PLAN:   Given his acute change in symptoms and worsening pain and crepitus, we will plan to get an MRI of his right knee which he wants to do in [place]. We will plan to do a video visit after the MRI is done to review results and next steps.    Patient seen and examined with Dr. Olivia Kline MD  Orthopedic Surgery,  PGY-5      Patient seen and examined with the resident. I also personally reviewed the images and interpreted the imaging myself.     Assesment: Almost 1 year status post meniscus root repair right knee with ongoing pain and swelling despite oral anti-inflammatories.  Nondiagnostic x-rays    Plan: MRI right knee.  Follow-up afterwards    I agree with history, physical and imaging as well as the assessment and plan as detailed by Dr. Kline.       Again, thank you for allowing me to participate in the care of your patient.        Sincerely,        Andres Baker MD    Electronically signed

## 2025-03-12 NOTE — PROGRESS NOTES
Patient seen and examined with the resident. I also personally reviewed the images and interpreted the imaging myself.     Assesment: Almost 1 year status post meniscus root repair right knee with ongoing pain and swelling despite oral anti-inflammatories.  Nondiagnostic x-rays    Plan: MRI right knee.  Follow-up afterwards    I agree with history, physical and imaging as well as the assessment and plan as detailed by Dr. Kline.

## 2025-03-12 NOTE — PROGRESS NOTES
DIAGNOSIS:   ~11 month post R meniscus root repair with worsening swelling and pain of R knee over the past two weeks    PROCEDURES:  R meniscus root repair - DOS 4/26/24    HISTORY:  Patient presents to clinic for follow-up approximately 11 months status post the above-stated procedure.  He had a change in his symptoms approximately 3-4 weeks ago without any known traumatic event.  He states that he began to have increasing lateral sided pain with more crepitus.  More recently over the last week, he has had exquisite medial sided knee pain.  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  He denies any overt locking but does notice some stiffness.  He states that his current symptoms feel different compared to when he had pain in September.  He denies any trauma to the knee, nausea, vomiting, fevers, recent illness, or numbness or tingling.    EXAM:     General: Awake, Alert, and oriented. Articulates and communicates with a normal affect     R Lower Extremity:  Incisions well healed without evidence of infection  Mild effusion   Range of motion 10 degrees shy of full extension to 125  Knee is stable to examination  Neurovascularly intact    IMAGING:  No new images obtained today    ASSESSMENT:  ~11 months status post meniscus root repair with increased pain and swelling over the last 2 weeks    PLAN:   Given his acute change in symptoms and worsening pain and crepitus, we will plan to get an MRI of his right knee which he wants to do in [place]. We will plan to do a video visit after the MRI is done to review results and next steps.    Patient seen and examined with Dr. Olivia Kline MD  Orthopedic Surgery, PGY-5

## 2025-03-12 NOTE — NURSING NOTE
"Reason For Visit:   Chief Complaint   Patient presents with    RECHECK     DOS: 4/26/24 right knee arthroscopy, meniscus root repair     Date of surgery: 4/26/24  Type of surgery:   PROCEDURE:  Examination under anesthesia Right Knee  Right Knee arthroscopy  Transosseous repair of Right medial meniscus root    He reports an insidious onset of right knee soreness, pain and swelling. He is having difficulty working and doing daily activities due to this. The majority of his pain is on the lateral knee and into his lower leg.     SANE Score  Left Knee: 100  Right Knee: 5    Pain Assessment  Patient Currently in Pain: Yes  0-10 Pain Scale: 3  Primary Pain Location: Knee    Ht 1.803 m (5' 11\")   Wt 90.7 kg (200 lb)   BMI 27.89 kg/m           Allergies   Allergen Reactions    Hydrocodone Itching     Patient does not like Vicodin.    Oxycodone works for patient.       Current Outpatient Medications   Medication Sig Dispense Refill    acetaminophen (TYLENOL) 325 MG tablet Take 2 tablets (650 mg) by mouth every 4 hours as needed for mild pain 50 tablet 0    amLODIPine (NORVASC) 5 MG tablet Take 1 tablet (5 mg) by mouth daily 90 tablet 3    diclofenac (VOLTAREN) 75 MG EC tablet Take 1 tablet (75 mg) by mouth 2 times daily. 60 tablet 0    hydrOXYzine HCl (ATARAX) 25 MG tablet Take 1 tablet (25 mg) by mouth 3 times daily as needed for itching 20 tablet 0    ibuprofen (ADVIL/MOTRIN) 800 MG tablet Take 1 tablet (800 mg) by mouth every 8 hours as needed for moderate pain 30 tablet 0     No current facility-administered medications for this visit.         Estelle Umana, ATC    "

## 2025-03-13 ENCOUNTER — TELEPHONE (OUTPATIENT)
Dept: ORTHOPEDICS | Facility: CLINIC | Age: 45
End: 2025-03-13
Payer: COMMERCIAL

## 2025-03-13 NOTE — RESULT ENCOUNTER NOTE
I had a chance to review the imaging study on Junior Rodriguez  Can you please call the patient and explain the following:  I reviewed the MRI.  It shows progressive arthritis of the medial compartment there is been significant wearing out of the cartilage since her last set of imaging.  In light of this information I think we should have him come back to my clinic I would like to get a new knee series.  I would also suggest a corticosteroid injection at that time.  I think we could just try to set this up as an in person visit.  Okay to do in [place].  I know he travels a fairly far distance.  Overall, I do think that his meniscus root repair is intact however he has had progressive arthritis of his knee.    Please document that you contact the patient, ok to offer a followup visit to discuss with me if the patient wants.

## 2025-03-13 NOTE — TELEPHONE ENCOUNTER
Called and talked with patient to discuss his MRI results from Dr. Baker.  I reviewed the MRI.  It shows progressive arthritis of the medial compartment there is been significant wearing out of the cartilage since her last set of imaging.  In light of this information I think we should have him come back to my clinic I would like to get a new knee series.  I would also suggest a corticosteroid injection at that time.  I think we could just try to set this up as an in person visit.  Overall, I do think that his meniscus root repair is intact however he has had progressive arthritis of his knee      Results were reviewed and patient had no questions. Appointment on Monday changed from video to inperson for him to get the injection.  Farhana Logan RN

## 2025-03-17 ENCOUNTER — OFFICE VISIT (OUTPATIENT)
Dept: ORTHOPEDICS | Facility: CLINIC | Age: 45
End: 2025-03-17
Payer: COMMERCIAL

## 2025-03-17 DIAGNOSIS — M25.561 CHRONIC PAIN OF RIGHT KNEE: Primary | ICD-10-CM

## 2025-03-17 DIAGNOSIS — G89.29 CHRONIC PAIN OF RIGHT KNEE: Primary | ICD-10-CM

## 2025-03-17 PROCEDURE — 1125F AMNT PAIN NOTED PAIN PRSNT: CPT | Performed by: ORTHOPAEDIC SURGERY

## 2025-03-17 PROCEDURE — 20610 DRAIN/INJ JOINT/BURSA W/O US: CPT | Mod: RT | Performed by: ORTHOPAEDIC SURGERY

## 2025-03-17 PROCEDURE — 99214 OFFICE O/P EST MOD 30 MIN: CPT | Mod: 25 | Performed by: ORTHOPAEDIC SURGERY

## 2025-03-17 RX ORDER — LIDOCAINE HYDROCHLORIDE 10 MG/ML
8 INJECTION, SOLUTION EPIDURAL; INFILTRATION; INTRACAUDAL; PERINEURAL
Status: COMPLETED | OUTPATIENT
Start: 2025-03-17 | End: 2025-03-17

## 2025-03-17 RX ORDER — TRIAMCINOLONE ACETONIDE 40 MG/ML
40 INJECTION, SUSPENSION INTRA-ARTICULAR; INTRAMUSCULAR
Status: COMPLETED | OUTPATIENT
Start: 2025-03-17 | End: 2025-03-17

## 2025-03-17 RX ADMIN — LIDOCAINE HYDROCHLORIDE 8 ML: 10 INJECTION, SOLUTION EPIDURAL; INFILTRATION; INTRACAUDAL; PERINEURAL at 08:15

## 2025-03-17 RX ADMIN — TRIAMCINOLONE ACETONIDE 40 MG: 40 INJECTION, SUSPENSION INTRA-ARTICULAR; INTRAMUSCULAR at 08:15

## 2025-03-17 NOTE — NURSING NOTE
[facility] [address] [address]  [phone]  Dept: [phone]  ______________________________________________________________________________    Patient: Junior Rodriguez   : 1980   MRN: [id number]   2025    INVASIVE PROCEDURE SAFETY CHECKLIST    Date: 3/17/2025   Procedure:Right knee steroid injection  Patient Name: Junior Rodriguez  MRN: [id number]  YOB: 1980    Action: Complete sections as appropriate. Any discrepancy results in a HARD COPY until resolved.     PRE PROCEDURE:  Patient ID verified with 2 identifiers (name and  or MRN): Yes  Procedure and site verified with patient/designee (when able): Yes  Accurate consent documentation in medical record: Yes  H&P (or appropriate assessment) documented in medical record: Yes  H&P must be up to 20 days prior to procedure and updates within 24 hours of procedure as applicable: Yes  Relevant diagnostic and radiology test results appropriately labeled and displayed as applicable: NA  Procedure site(s) marked with provider initials: Yes    TIMEOUT:  Time-Out performed immediately prior to starting procedure, including verbal and active participation of all team members addressing the following:Yes  * Correct patient identify  * Confirmed that the correct side and site are marked  * An accurate procedure consent form  * Agreement on the procedure to be done  * Correct patient position  * Relevant images and results are properly labeled and appropriately displayed  * The need to administer antibiotics or fluids for irrigation purposes during the procedure as applicable   * Safety precautions based on patient history or medication use    DURING PROCEDURE: Verification of correct person, site, and procedures any time the responsibility for care of the patient is transferred to another member of the care team.       The following medications were given:         Prior  to injection, verified patient identity using patient's name and date of birth.  Due to injection administration, patient instructed to remain in clinic for 15 minutes  afterwards, and to report any adverse reaction to me immediately.    Right knee Joint injection was performed.    Medication Name: Lidocaine 1%  NDC 33091-613-24 Lot #  [id number]  Drug Amount Wasted:  Yes: 42 mg/ml   Vial/Syringe: Single dose vial  Expiration Date:  08/01/2027    Medication Name Kenalog 40mg  NDC 21358-689-56 Lot [id number] Exp: 7/1/2026    Scribed by Abigail Heart CMA for Dr. Baker on March 17, 2025 at 8:19am based on the provider's statements to me.     Abigail Heart CMA

## 2025-03-17 NOTE — NURSING NOTE
Reason For Visit:   Chief Complaint   Patient presents with    RECHECK     Knee injection - right            Date of surgery: 4/26/2024    Type of surgery: right knee examination under anesthesia, knee arthroscopy, meniscus root repair .    SANE Score  Left Knee: 100  Right Knee: 5    Pain Assessment  Patient Currently in Pain: Yes  Patient's Stated Pain Goal: 2  0-10 Pain Scale: 2  Primary Pain Location: Knee (Right)    There were no vitals taken for this visit.         Allergies   Allergen Reactions    Hydrocodone Itching     Patient does not like Vicodin.    Oxycodone works for patient.       Current Outpatient Medications   Medication Sig Dispense Refill    acetaminophen (TYLENOL) 325 MG tablet Take 2 tablets (650 mg) by mouth every 4 hours as needed for mild pain 50 tablet 0    amLODIPine (NORVASC) 5 MG tablet Take 1 tablet (5 mg) by mouth daily 90 tablet 3    diclofenac (VOLTAREN) 75 MG EC tablet Take 1 tablet (75 mg) by mouth 2 times daily. 60 tablet 0    hydrOXYzine HCl (ATARAX) 25 MG tablet Take 1 tablet (25 mg) by mouth 3 times daily as needed for itching 20 tablet 0    ibuprofen (ADVIL/MOTRIN) 800 MG tablet Take 1 tablet (800 mg) by mouth every 8 hours as needed for moderate pain 30 tablet 0     No current facility-administered medications for this visit.         Abigail Heart, CMA

## 2025-03-17 NOTE — PROGRESS NOTES
Junior Rodriguez returns to my clinic today for interval follow-up.  I did try to see him last week when we elected to obtain a new MRI of his knee this does demonstrate progressive arthritis of the medial compartment of his right knee.  His medial meniscus root repair appears intact.  I reviewed his imaging and we made plans to have him come in for a corticosteroid injection.    Exam today shows medial joint line tenderness.  Ligaments stable.  Neurovascular intact.    MRI imaging independent reviewed by myself today does demonstrate progressive arthritis medial compartment with subchondral edema and loss of the cartilage cutting surfaces.  Medial meniscus root repair appears intact.  Ligaments otherwise intact.    Clinical assessment: Progressive osteoarthritis medial compartment right knee    Plan: Long discussion with the patient.  Reviewed the diagnosis potential treatment options.  At this time I told him that our goal for care should be to make his knee as good as he can for as long as we can and if and when it wears out he has failure of nonsurgical intervention consideration of knee replacement surgery.  No role for arthroscopic surgery in the future or revision root repair surgery.    In light of this we will plan for an injection today.  He can get 2 to 3 injections/year.  No lifetime maximum.    After written informed consent obtained and signed, after sufficient prepping and sterile technique, 40 mg of kenalog and , 8 cc of 1% lidocaine were injected without complication into the right knee. The patient tolerated the injection well and a sterile dressing was applied.     The things that he has control over this keep his muscle strong, keep his joints moving, his keep his weight at a good level like it is and do not smoke.  The patient is going to work on smoking cessation.    Follow-up with me on an as-needed basis    Large Joint Injection/Arthocentesis: R knee joint    Date/Time: 3/17/2025 8:15  AM    Performed by: Andres Baker MD  Authorized by: Andres Baker MD    Indications:  Pain  Needle Size:  21 G  Guidance: landmark guided    Location:  Knee      Medications:  40 mg triamcinolone 40 MG/ML; 8 mL lidocaine (PF) 1 %  Outcome:  Tolerated well, no immediate complications  Procedure discussed: discussed risks, benefits, and alternatives    Consent Given by:  Patient  Timeout: timeout called immediately prior to procedure    Prep: patient was prepped and draped in usual sterile fashion

## 2025-03-17 NOTE — LETTER
3/17/2025      Junior Rodriguez  [id number] [address]  [facility] [address]      Dear Colleague,    Thank you for referring your patient, Junior Rodriguez, to the [facility] [place]. Please see a copy of my visit note below.    Junior Rodriguez returns to my clinic today for interval follow-up.  I did try to see him last week when we elected to obtain a new MRI of his knee this does demonstrate progressive arthritis of the medial compartment of his right knee.  His medial meniscus root repair appears intact.  I reviewed his imaging and we made plans to have him come in for a corticosteroid injection.    Exam today shows medial joint line tenderness.  Ligaments stable.  Neurovascular intact.    MRI imaging independent reviewed by myself today does demonstrate progressive arthritis medial compartment with subchondral edema and loss of the cartilage cutting surfaces.  Medial meniscus root repair appears intact.  Ligaments otherwise intact.    Clinical assessment: Progressive osteoarthritis medial compartment right knee    Plan: Long discussion with the patient.  Reviewed the diagnosis potential treatment options.  At this time I told him that our goal for care should be to make his knee as good as he can for as long as we can and if and when it wears out he has failure of nonsurgical intervention consideration of knee replacement surgery.  No role for arthroscopic surgery in the future or revision root repair surgery.    In light of this we will plan for an injection today.  He can get 2 to 3 injections/year.  No lifetime maximum.    After written informed consent obtained and signed, after sufficient prepping and sterile technique, 40 mg of kenalog and , 8 cc of 1% lidocaine were injected without complication into the right knee. The patient tolerated the injection well and a sterile dressing was applied.     The things that he has control over this keep his muscle strong, keep his joints moving,  his keep his weight at a good level like it is and do not smoke.  The patient is going to work on smoking cessation.    Follow-up with me on an as-needed basis    Large Joint Injection/Arthocentesis: R knee joint    Date/Time: 3/17/2025 8:15 AM    Performed by: Andres Baker MD  Authorized by: Andres Baker MD    Indications:  Pain  Needle Size:  21 G  Guidance: landmark guided    Location:  Knee      Medications:  40 mg triamcinolone 40 MG/ML; 8 mL lidocaine (PF) 1 %  Outcome:  Tolerated well, no immediate complications  Procedure discussed: discussed risks, benefits, and alternatives    Consent Given by:  Patient  Timeout: timeout called immediately prior to procedure    Prep: patient was prepped and draped in usual sterile fashion          Again, thank you for allowing me to participate in the care of your patient.        Sincerely,        Andres Baker MD    Electronically signed

## 2025-05-14 DIAGNOSIS — I73.00 RAYNAUD'S PHENOMENON WITHOUT GANGRENE: ICD-10-CM

## 2025-05-15 RX ORDER — AMLODIPINE BESYLATE 5 MG/1
5 TABLET ORAL DAILY
Qty: 30 TABLET | Refills: 0 | Status: SHIPPED | OUTPATIENT
Start: 2025-05-15

## 2025-06-17 NOTE — TELEPHONE ENCOUNTER
Pt called back in regards to these test results, would like a call back to discuss them as soon as possible.    Phone number: [phone]   MEDICATIONS  (PRN):  acetaminophen     Tablet .. 650 milliGRAM(s) Oral every 6 hours PRN Temp greater or equal to 38C (100.4F), Mild Pain (1 - 3), Moderate Pain (4 - 6)  albuterol    90 MICROgram(s) HFA Inhaler 2 Puff(s) Inhalation every 6 hours PRN Shortness of Breath and/or Wheezing  lidocaine   4% Patch 1 Patch Transdermal daily PRN chronic back pain  loperamide 2 milliGRAM(s) Oral once PRN Diarrhea  OLANZapine 1.25 milliGRAM(s) Oral every 6 hours PRN agitation

## (undated) DEVICE — SUCTION MANIFOLD NEPTUNE 2 SYS 4 PORT 0702-020-000

## (undated) DEVICE — PAD ARMBOARD FOAM EGGCRATE COVIDEN 3114367

## (undated) DEVICE — PIN GUIDE ARTHREX 2.4MM DRILL  AR-1250L

## (undated) DEVICE — ESU PENCIL SMOKE EVAC W/ROCKER SWITCH 0703-047-000

## (undated) DEVICE — GLOVE BIOGEL PI MICRO INDICATOR UNDERGLOVE SZ 8.0 48980

## (undated) DEVICE — BUR ARTHREX COOLCUT SABRE 4.0MMX13CM AR-8400SR

## (undated) DEVICE — SOL NACL 0.9% IRRIG 3000ML BAG 2B7477

## (undated) DEVICE — PACK ARTHROSCOPY CUSTOM ASC

## (undated) DEVICE — PREP CHLORAPREP 26ML TINTED ORANGE  260815

## (undated) DEVICE — TUBING SYSTEM ARTHREX PATIENT REDEUCE AR-6421

## (undated) DEVICE — LINEN ORTHO PACK 5446

## (undated) DEVICE — ESU GROUND PAD ADULT W/CORD E7507

## (undated) DEVICE — GLOVE BIOGEL PI MICRO SZ 8.0 48580

## (undated) RX ORDER — ONDANSETRON 2 MG/ML
INJECTION INTRAMUSCULAR; INTRAVENOUS
Status: DISPENSED
Start: 2022-04-19

## (undated) RX ORDER — FENTANYL CITRATE 50 UG/ML
INJECTION, SOLUTION INTRAMUSCULAR; INTRAVENOUS
Status: DISPENSED
Start: 2024-04-26

## (undated) RX ORDER — FENTANYL CITRATE 50 UG/ML
INJECTION, SOLUTION INTRAMUSCULAR; INTRAVENOUS
Status: DISPENSED
Start: 2022-04-19

## (undated) RX ORDER — LIDOCAINE HYDROCHLORIDE 5 MG/ML
INJECTION, SOLUTION INFILTRATION; INTRAVENOUS
Status: DISPENSED
Start: 2025-03-17

## (undated) RX ORDER — TRIAMCINOLONE ACETONIDE 40 MG/ML
INJECTION, SUSPENSION INTRA-ARTICULAR; INTRAMUSCULAR
Status: DISPENSED
Start: 2025-03-17

## (undated) RX ORDER — LEVOFLOXACIN 5 MG/ML
INJECTION, SOLUTION INTRAVENOUS
Status: DISPENSED
Start: 2022-04-19

## (undated) RX ORDER — PROPOFOL 10 MG/ML
INJECTION, EMULSION INTRAVENOUS
Status: DISPENSED
Start: 2024-04-26

## (undated) RX ORDER — KETOROLAC TROMETHAMINE 30 MG/ML
INJECTION, SOLUTION INTRAMUSCULAR; INTRAVENOUS
Status: DISPENSED
Start: 2024-04-26

## (undated) RX ORDER — DEXAMETHASONE SODIUM PHOSPHATE 4 MG/ML
INJECTION, SOLUTION INTRA-ARTICULAR; INTRALESIONAL; INTRAMUSCULAR; INTRAVENOUS; SOFT TISSUE
Status: DISPENSED
Start: 2024-04-26

## (undated) RX ORDER — HYDROMORPHONE HYDROCHLORIDE 1 MG/ML
INJECTION, SOLUTION INTRAMUSCULAR; INTRAVENOUS; SUBCUTANEOUS
Status: DISPENSED
Start: 2024-04-26

## (undated) RX ORDER — ONDANSETRON 2 MG/ML
INJECTION INTRAMUSCULAR; INTRAVENOUS
Status: DISPENSED
Start: 2024-04-26

## (undated) RX ORDER — CEFAZOLIN SODIUM 2 G/50ML
SOLUTION INTRAVENOUS
Status: DISPENSED
Start: 2024-04-26

## (undated) RX ORDER — OXYCODONE HYDROCHLORIDE 5 MG/1
TABLET ORAL
Status: DISPENSED
Start: 2024-04-26

## (undated) RX ORDER — ACETAMINOPHEN 325 MG/1
TABLET ORAL
Status: DISPENSED
Start: 2024-04-26